# Patient Record
Sex: MALE | Race: WHITE | NOT HISPANIC OR LATINO | URBAN - METROPOLITAN AREA
[De-identification: names, ages, dates, MRNs, and addresses within clinical notes are randomized per-mention and may not be internally consistent; named-entity substitution may affect disease eponyms.]

---

## 2017-11-29 ENCOUNTER — INPATIENT (INPATIENT)
Facility: HOSPITAL | Age: 47
LOS: 2 days | Discharge: HOME | DRG: 65 | End: 2017-12-02
Attending: PSYCHIATRY & NEUROLOGY | Admitting: PSYCHIATRY & NEUROLOGY
Payer: COMMERCIAL

## 2017-11-29 VITALS
OXYGEN SATURATION: 97 % | TEMPERATURE: 98 F | RESPIRATION RATE: 18 BRPM | HEART RATE: 76 BPM | SYSTOLIC BLOOD PRESSURE: 180 MMHG | DIASTOLIC BLOOD PRESSURE: 97 MMHG

## 2017-11-29 DIAGNOSIS — B20 HUMAN IMMUNODEFICIENCY VIRUS [HIV] DISEASE: ICD-10-CM

## 2017-11-29 DIAGNOSIS — R47.01 APHASIA: ICD-10-CM

## 2017-11-29 DIAGNOSIS — F32.9 MAJOR DEPRESSIVE DISORDER, SINGLE EPISODE, UNSPECIFIED: ICD-10-CM

## 2017-11-29 DIAGNOSIS — R63.8 OTHER SYMPTOMS AND SIGNS CONCERNING FOOD AND FLUID INTAKE: ICD-10-CM

## 2017-11-29 DIAGNOSIS — Z29.9 ENCOUNTER FOR PROPHYLACTIC MEASURES, UNSPECIFIED: ICD-10-CM

## 2017-11-29 DIAGNOSIS — F41.9 ANXIETY DISORDER, UNSPECIFIED: ICD-10-CM

## 2017-11-29 LAB
ALBUMIN SERPL ELPH-MCNC: 4.4 G/DL — SIGNIFICANT CHANGE UP (ref 3.4–5)
ALP SERPL-CCNC: 48 U/L — SIGNIFICANT CHANGE UP (ref 40–120)
ALT FLD-CCNC: 39 U/L — SIGNIFICANT CHANGE UP (ref 12–42)
ANION GAP SERPL CALC-SCNC: 10 MMOL/L — SIGNIFICANT CHANGE UP (ref 9–16)
APPEARANCE UR: CLEAR — SIGNIFICANT CHANGE UP
AST SERPL-CCNC: 23 U/L — SIGNIFICANT CHANGE UP (ref 15–37)
BILIRUB SERPL-MCNC: 0.4 MG/DL — SIGNIFICANT CHANGE UP (ref 0.2–1.2)
BILIRUB UR-MCNC: NEGATIVE — SIGNIFICANT CHANGE UP
BUN SERPL-MCNC: 18 MG/DL — SIGNIFICANT CHANGE UP (ref 7–23)
CALCIUM SERPL-MCNC: 9.4 MG/DL — SIGNIFICANT CHANGE UP (ref 8.5–10.5)
CHLORIDE SERPL-SCNC: 103 MMOL/L — SIGNIFICANT CHANGE UP (ref 96–108)
CO2 SERPL-SCNC: 28 MMOL/L — SIGNIFICANT CHANGE UP (ref 22–31)
COLOR SPEC: YELLOW — SIGNIFICANT CHANGE UP
CREAT SERPL-MCNC: 1.07 MG/DL — SIGNIFICANT CHANGE UP (ref 0.5–1.3)
DIFF PNL FLD: NEGATIVE — SIGNIFICANT CHANGE UP
ETHANOL SERPL-MCNC: <3 MG/DL — SIGNIFICANT CHANGE UP
GLUCOSE SERPL-MCNC: 99 MG/DL — SIGNIFICANT CHANGE UP (ref 70–99)
GLUCOSE UR QL: NEGATIVE — SIGNIFICANT CHANGE UP
HCT VFR BLD CALC: 38.9 % — LOW (ref 39–50)
HGB BLD-MCNC: 13.5 G/DL — SIGNIFICANT CHANGE UP (ref 13–17)
INR BLD: 1.07 — SIGNIFICANT CHANGE UP (ref 0.88–1.16)
KETONES UR-MCNC: NEGATIVE — SIGNIFICANT CHANGE UP
LEUKOCYTE ESTERASE UR-ACNC: NEGATIVE — SIGNIFICANT CHANGE UP
MCHC RBC-ENTMCNC: 33 PG — SIGNIFICANT CHANGE UP (ref 27–34)
MCHC RBC-ENTMCNC: 34.7 G/DL — SIGNIFICANT CHANGE UP (ref 32–36)
MCV RBC AUTO: 95.1 FL — SIGNIFICANT CHANGE UP (ref 80–100)
NITRITE UR-MCNC: NEGATIVE — SIGNIFICANT CHANGE UP
PCP SPEC-MCNC: SIGNIFICANT CHANGE UP
PH UR: 5.5 — SIGNIFICANT CHANGE UP (ref 5–8)
PLATELET # BLD AUTO: 214 K/UL — SIGNIFICANT CHANGE UP (ref 150–400)
POTASSIUM SERPL-MCNC: 3.7 MMOL/L — SIGNIFICANT CHANGE UP (ref 3.5–5.3)
POTASSIUM SERPL-SCNC: 3.7 MMOL/L — SIGNIFICANT CHANGE UP (ref 3.5–5.3)
PROT SERPL-MCNC: 8.3 G/DL — HIGH (ref 6.4–8.2)
PROT UR-MCNC: NEGATIVE MG/DL — SIGNIFICANT CHANGE UP
PROTHROM AB SERPL-ACNC: 11.8 SEC — SIGNIFICANT CHANGE UP (ref 9.8–12.7)
RBC # BLD: 4.09 M/UL — LOW (ref 4.2–5.8)
RBC # FLD: 13.1 % — SIGNIFICANT CHANGE UP (ref 10.3–16.9)
SODIUM SERPL-SCNC: 141 MMOL/L — SIGNIFICANT CHANGE UP (ref 132–145)
SP GR SPEC: 1.01 — SIGNIFICANT CHANGE UP (ref 1–1.03)
TROPONIN I SERPL-MCNC: <0.017 NG/ML — LOW (ref 0.02–0.06)
UROBILINOGEN FLD QL: 0.2 E.U./DL — SIGNIFICANT CHANGE UP
WBC # BLD: 9.3 K/UL — SIGNIFICANT CHANGE UP (ref 3.8–10.5)
WBC # FLD AUTO: 9.3 K/UL — SIGNIFICANT CHANGE UP (ref 3.8–10.5)

## 2017-11-29 PROCEDURE — 93010 ELECTROCARDIOGRAM REPORT: CPT

## 2017-11-29 PROCEDURE — 70496 CT ANGIOGRAPHY HEAD: CPT | Mod: 26

## 2017-11-29 PROCEDURE — 99223 1ST HOSP IP/OBS HIGH 75: CPT

## 2017-11-29 PROCEDURE — 71020: CPT | Mod: 26

## 2017-11-29 PROCEDURE — 99285 EMERGENCY DEPT VISIT HI MDM: CPT | Mod: 25

## 2017-11-29 PROCEDURE — 70498 CT ANGIOGRAPHY NECK: CPT | Mod: 26

## 2017-11-29 RX ORDER — ATORVASTATIN CALCIUM 80 MG/1
80 TABLET, FILM COATED ORAL ONCE
Qty: 0 | Refills: 0 | Status: COMPLETED | OUTPATIENT
Start: 2017-11-29 | End: 2017-11-29

## 2017-11-29 RX ORDER — ASPIRIN/CALCIUM CARB/MAGNESIUM 324 MG
325 TABLET ORAL ONCE
Qty: 0 | Refills: 0 | Status: COMPLETED | OUTPATIENT
Start: 2017-11-29 | End: 2017-11-29

## 2017-11-29 RX ORDER — SODIUM CHLORIDE 9 MG/ML
3 INJECTION INTRAMUSCULAR; INTRAVENOUS; SUBCUTANEOUS ONCE
Qty: 0 | Refills: 0 | Status: COMPLETED | OUTPATIENT
Start: 2017-11-29 | End: 2017-11-29

## 2017-11-29 RX ADMIN — ATORVASTATIN CALCIUM 80 MILLIGRAM(S): 80 TABLET, FILM COATED ORAL at 18:47

## 2017-11-29 RX ADMIN — Medication 325 MILLIGRAM(S): at 18:47

## 2017-11-29 RX ADMIN — SODIUM CHLORIDE 3 MILLILITER(S): 9 INJECTION INTRAMUSCULAR; INTRAVENOUS; SUBCUTANEOUS at 17:36

## 2017-11-29 NOTE — ED PROVIDER NOTE - OBJECTIVE STATEMENT
47y M HIV+ (undetectable, on meds) with hx of depression, anxiety presents to ED c/o aphasia since. Pt states he woke up yesterday morning, and took an Ativan, which he takes occasionally. Pt noticed around 7AM that he was having difficulty speaking normally. Pt then went to his PMD for a depression follow-up, and was referred to ED to r/o stroke due to his difficulty communicating. Denies headache, visual changes, numbness, CP, SOB, abd pain, n/v/d, tinnitus, difficulty walking, focal weakness, lightheadedness, dizziness, fever, chills, head trauma, recent fall, or recent travel. Denies confusion, but notes he feels "slower than usual". Pt notes he stopped smoking 4 months ago, and has used cocaine in the past.     PMD: Dr. Debbie De La Fuente [phone]

## 2017-11-29 NOTE — ED PROVIDER NOTE - ATTENDING CONTRIBUTION TO CARE
Patient presenting with expressive aphasia since last night. VSS. + mild expressive aphasia on exam. CT head shows subacute left basal ganglia CVA. Admitted to [facility] neuro stroke svc.

## 2017-11-29 NOTE — H&P ADULT - NSHPSOCIALHISTORY_GEN_ALL_CORE
former smoker (quit 4 months ago) and has used cocaine in past former smoker (quit 4 months ago) and has used cocaine in past  + etoh, 2 drinks nightly, 0/4 CAGE

## 2017-11-29 NOTE — H&P ADULT - HISTORY OF PRESENT ILLNESS
47 year old male, HIV + (on HAART), with a pmhx of depression, anxiety, presents to ED with difficulty communicating x yesterday morning at apx 7am. At that time, patient took ativan, which he takes regularly for anxiety. Patient went to see his PMD for f/u visit, and was sent to ED for r/o CVA. Patient denies any confusion, but states he feels "slower than usual". Patient denies any headaches, visual changes, numbness, CP, SOB, abdominal pain, N/V/D, difficulty walking, focal weakness, lightheadedness, dizziness, fever, chills, head trauma, or recent fall. Of note, patient former smoker (quit 4 months ago) and has used cocaine in past. In ED, /97, HR 76. VS otherwise normal. CT head suggestive of non-acute stroke with hypodensity in left basal ganglia. CTA head + neck negative.  and Lipitor 80 given. Patient to be admitted to [facility] for further workup 47 year old male, HIV + (on HAART, CD4 "normal" with undetectable VL as per pt), with a pmhx of depression and anxiety, presents to ED with difficulty communicating x yesterday morning at apx 7am. At that time, patient took ativan, which he takes regularly for anxiety. Patient with little interaction that day, attributed aphasia to fatigue and tiredness. Patient noticed no improvement later in the evening while talking to . Patient went to see his PMD for f/u visit the next morning, and was sent to ED for r/o CVA. Patient denies any confusion, headaches, visual changes, numbness, CP, SOB, abdominal pain, N/V/D, difficulty walking, focal weakness, lightheadedness, dizziness, fever, chills, head trauma, or recent fall. Of note, patient former smoker (quit 4 months ago), +etoh use (2 drinks / night, 0/4 CAGE). and has used cocaine in past. Denies recent travel. In ED, /97, HR 76. VS otherwise normal. CT head suggestive of non-acute stroke with hypodensity in left basal ganglia. CTA head + neck negative.  and Lipitor 80 given. Patient to be admitted to [facility] for further workup 47 year old male, HIV + (on HAART, CD4 "normal" with undetectable VL as per pt), with a pmhx of depression and anxiety, presents to ED with difficulty communicating x yesterday morning at apx 7am. At that time, patient took ativan, which he takes regularly for anxiety. Patient with little interaction that day, attributed aphasia to fatigue and tiredness. Patient noticed no improvement later in the evening while talking to . Patient went to see his PMD for f/u visit the next morning, and was sent to ED for r/o CVA. Patient denies any confusion, headaches, visual changes, numbness, CP, SOB, abdominal pain, N/V/D, difficulty walking, focal weakness, lightheadedness, dizziness, fever, chills, head trauma, or recent fall. Of note, patient former smoker (quit 4 months ago), +etoh use (2 drinks / night, 0/4 CAGE). and has used cocaine in past. Denies recent travel. In ED, /97, HR 76. VS otherwise normal. CT head suggestive of non-acute stroke with hypodensity in left basal ganglia. CTA head + neck negative.  and Lipitor 80 given. NIH: 1 upon arrival. Patient to be admitted to [facility] for further workup 47 year old male, HIV + (on HAART, CD4 "normal" with undetectable VL as per pt), with a pmhx of depression and anxiety, presents to ED with difficulty communicating x yesterday morning at apx 7am. At that time, patient took ativan, which he takes regularly for anxiety. Patient with little interaction that day, attributed aphasia to fatigue and tiredness. Patient noticed no improvement later in the evening while talking to . Patient went to see his PMD for f/u visit the next morning, and was sent to ED for r/o CVA. Patient denies any confusion, headaches, visual changes, numbness, CP, SOB, abdominal pain, N/V/D, difficulty walking, focal weakness, lightheadedness, dizziness, fever, chills, head trauma, or recent fall. Of note, patient former smoker (quit 4 months ago), +etoh use (2 drinks / night, 0/4 CAGE). and has used cocaine in past. Denies recent travel. In ED, /97, HR 76. VS otherwise normal. CT head suggestive of subacute left BG stroke and an acute left frontal stroke CTA head + neck negative.  and Lipitor 80 given. NIH: 1 upon arrival. Patient to be admitted to [facility] for further workup

## 2017-11-29 NOTE — H&P ADULT - NSHPPHYSICALEXAM_GEN_ALL_CORE
.  VITAL SIGNS:  T(C): 37.1 (11-29-17 @ 21:52), Max: 37.1 (11-29-17 @ 21:52)  T(F): 98.8 (11-29-17 @ 21:52), Max: 98.8 (11-29-17 @ 21:52)  HR: 70 (11-30-17 @ 00:00) (67 - 77)  BP: 155/88 (11-30-17 @ 00:00) (137/81 - 180/97)  BP(mean): 115 (11-30-17 @ 00:00) (115 - 115)  RR: 18 (11-30-17 @ 00:00) (16 - 18)  SpO2: 98% (11-30-17 @ 00:00) (97% - 100%)  Wt(kg): --    PHYSICAL EXAM:    Constitutional: WDWN resting comfortably in bed; NAD  Head: NC/AT  Eyes: PERRL, EOMI, clear conjunctiva  ENT: no nasal discharge; uvula midline, no oropharyngeal erythema or exudates; MMM  Neck: supple; no JVD or thyromegaly  Respiratory: CTA B/L; no W/R/R, no retractions  Cardiac: +S1/S2; RRR; no M/R/G; PMI non-displaced  Gastrointestinal: soft, NT/ND; no rebound or guarding; +BSx4  Genitourinary: normal external genitalia  Back: spine midline, no bony tenderness or step-offs; no CVAT B/L  Extremities: WWP, no clubbing or cyanosis; no peripheral edema  Musculoskeletal: NROM x4; no joint swelling, tenderness or erythema  Vascular: 2+ radial, femoral, DP/PT pulses B/L  Dermatologic: skin warm, dry and intact; no rashes, wounds, or scars  Lymphatic: no submandibular or cervical LAD  Mental status: Awake, alert and oriented x3.  Recent and remote memory intact.  Naming, repetition and comprehension intact.  Attention/concentration intact.    *Expressive aphasia with visible frustration.   Motor:  Normal bulk and tone, strength 5/5 in bilateral upper and lower extremities.   strength 5/5.  Rapid alternating movements intact and symmetric.   Sensation: Intact to light touch, proprioception, vibration, temperature, pinprick.  No neglect.   Coordination: No dysmetria on finger-to-nose and heel-to-shin.  No clumsiness.  Reflexes: 2+ in upper and lower extremities, absent Babinski bilaterally  Gait: No ataxia

## 2017-11-29 NOTE — H&P ADULT - ASSESSMENT
47 year old male, HIV + (on HAART), with a pmhx of depression, anxiety, presents to ED with expressive aphasia

## 2017-11-29 NOTE — ED PROVIDER NOTE - MEDICAL DECISION MAKING DETAILS
46 y/o M presents to ED with expressive aphasia.  Last known well was 2 night ago before going to sleep.  No focal neuro deficits/weakness, changes in sensation.  CT consistent with basal ganglia subacute stroke.  Pt discussed with Dr. Larose, neurostroke on call via [facility] transfer center.  Pt accepted.  Will given  and Lipitor 80 mg as instructed by neuro, order CTA head and neck to be performed prior to transfer and pt can be transferred with results pending.  Pt amenable to admission.

## 2017-11-29 NOTE — ED ADULT NURSE NOTE - OBJECTIVE STATEMENT
Pt c/o difficulty speaking for 2 days. Pt was sent to ED by his PMD. Pt denies numbness, weakness. Moves all extremities full strength

## 2017-11-29 NOTE — H&P ADULT - PROBLEM SELECTOR PLAN 3
c/w fluoxetine daily  2-3 tablets of ativan / week (doesn't know dosage)  watch out for benzo withdrawal

## 2017-11-29 NOTE — H&P ADULT - NSHPLABSRESULTS_GEN_ALL_CORE
EXAM:  CT BRAIN  IMPRESSION: Focal area of hypodensity involving the left basal   ganglia/corona radiata which may represent subacute ischemia. No   intracranial hemorrhage.      EXAM:  CT ANGIO NECK (W)AW IC                        EXAM:  CT ANGIO BRAIN (W)AW IC                           PROCEDURE DATE:  11/29/2017     100  Omnipaque 350   5       INTERPRETATION:  PROCEDURE: CTA brain with and without intravenous   contrast.    INDICATION: Expressive aphasia    TECHNIQUE: Multiple thin section axial images were obtained through the   [place] following the intravenous injection of contrast. MPR   sagittal and coronal MIP images were generated from the axial images. 3-D   reconstructions were also obtained and postprocessed on a independent   workstation.    COMPARISON: None    FINDINGS: The CTA examination of the [place] demonstrates the   internal carotid arteries to be normal in caliber. Thereis a normal   bifurcation into the A1 and M1 segments. There is a normal MCA   bifurcation. There is tortuosity of the vertebral basilar system. The   vertebral arteries are otherwise normal in caliber. The basilar artery is   normal in caliber. There is a normal bifurcation into the posterior   cerebral arteries. The right P1 segment is hypoplastic with a prominent   right posterior communicating artery supplying the right PCA territory.   There are no areas of stenosis, dilatation or aneurysm.    IMPRESSION: No large vessel occlusion.      PROCEDURE: CTA neck with and without intravenous contrast.    INDICATION: Expressive aphasia    TECHNIQUE: Multiple axial thin section were obtained through the neck   following the intravenous injection of contrast. MPR sagittal and coronal   MIP images were generated from the axial images. 3-D reconstructions were   also obtained and postprocessed on a independent workstation.    COMPARISON: None    FINDINGS: The CTA examination demonstrates the right common carotid   artery to be normal in caliber. There is a normal bifurcation into the   right internal and external carotid arteries. There is no hemodynamically   significant stenosis. There is an ectatic course to the proximal right   internal carotid artery.    There is a retropharyngeal course to the left common carotid artery. The   left common carotid artery is otherwise normal in caliber. There is a   normal bifurcation into the left internal and external carotid arteries.   There is no hemodynamically significant stenosis. There is a looped   configuration to the distal left internal carotid artery.    There is an ectatic course to the proximal vertebral arteries   bilaterally. The visualized vertebral arteries are normal in caliber.    The aortic arch appears intact without narrowing of the origin of the   great vessels.    IMPRESSION: No carotid stenosis.

## 2017-11-29 NOTE — H&P ADULT - PROBLEM SELECTOR PLAN 1
NIH: 1 upon arrival to tele. expressive aphasia started on 11/28 at apx 7am. CT head with hypodense lesion at left basal gangia. CTA head + neck negative. s/p  and Lipitor 80 in ED.   - will c/w ASA 81mg, Lipitor 80mg daily   - PT/ OT/ speech consult   - bedside dysphagia screen   permissive hypertension   - echo with bubble  - likely will need ERNA  - consider MRI brain   - hemoglobin A1c, lipid profile, TSH, CBC, CMP, Coags   - smoking cessation NIH: 1 upon arrival to tele. expressive aphasia started on 11/28 at apx 7am. CT head with hypodense lesion at left basal gangia. CTA head + neck negative. s/p  and Lipitor 80 in ED.   - will c/w ASA 81mg, Lipitor 80mg daily   - PT/ OT/ speech consult   - bedside dysphagia screen   permissive hypertension   - echo with bubble  - likely will need ERNA  - EKG  - consider MRI brain   - hemoglobin A1c, lipid profile, TSH, CBC, CMP, Coags   - hypercoagulable workup   - smoking cessation

## 2017-11-29 NOTE — ED ADULT TRIAGE NOTE - CHIEF COMPLAINT QUOTE
Aphasia since yesterday. was sent by PMD to r/o CVA.   Pt states language was worst yesterday. Was able to speak  and communicate at triage, but states has problems, "putting words together"  patient crying and states has a history of HIV, ANXIETY, DEPRESSION.;

## 2017-11-29 NOTE — ED ADULT NURSE REASSESSMENT NOTE - NS ED NURSE REASSESS COMMENT FT1
AMANDA Doty spoke with transport again to inquire about EMS taking patient to [facility]; they are aware of how long It is taking to get EMS to transport patient; we are to call again if they do not show up in 30 minutes

## 2017-11-29 NOTE — H&P ADULT - PROBLEM SELECTOR PLAN 2
compliant. doesn't miss any doses. no side effects. good CD4, un det. VL as per pt   -c/w Tivicay, Descovy, Prezcobix  -contact HIV specialist/ PMD in AM for collateral (recent CD4/ VL)

## 2017-11-30 LAB
ALBUMIN SERPL ELPH-MCNC: 4.7 G/DL — SIGNIFICANT CHANGE UP (ref 3.3–5)
ALP SERPL-CCNC: 45 U/L — SIGNIFICANT CHANGE UP (ref 40–120)
ALT FLD-CCNC: 26 U/L — SIGNIFICANT CHANGE UP (ref 10–45)
ANION GAP SERPL CALC-SCNC: 16 MMOL/L — SIGNIFICANT CHANGE UP (ref 5–17)
APTT BLD: 32.5 SEC — SIGNIFICANT CHANGE UP (ref 27.5–37.4)
AST SERPL-CCNC: 23 U/L — SIGNIFICANT CHANGE UP (ref 10–40)
BILIRUB SERPL-MCNC: 0.4 MG/DL — SIGNIFICANT CHANGE UP (ref 0.2–1.2)
BUN SERPL-MCNC: 16 MG/DL — SIGNIFICANT CHANGE UP (ref 7–23)
CALCIUM SERPL-MCNC: 8.9 MG/DL — SIGNIFICANT CHANGE UP (ref 8.4–10.5)
CHLORIDE SERPL-SCNC: 99 MMOL/L — SIGNIFICANT CHANGE UP (ref 96–108)
CHOLEST SERPL-MCNC: 322 MG/DL — HIGH (ref 10–199)
CO2 SERPL-SCNC: 25 MMOL/L — SIGNIFICANT CHANGE UP (ref 22–31)
CREAT SERPL-MCNC: 1.11 MG/DL — SIGNIFICANT CHANGE UP (ref 0.5–1.3)
GLUCOSE SERPL-MCNC: 116 MG/DL — HIGH (ref 70–99)
HBA1C BLD-MCNC: 5.2 % — SIGNIFICANT CHANGE UP (ref 4–5.6)
HCT VFR BLD CALC: 40.6 % — SIGNIFICANT CHANGE UP (ref 39–50)
HCYS SERPL-MCNC: 7.2 UMOL/L — SIGNIFICANT CHANGE UP (ref 5–15)
HDLC SERPL-MCNC: 42 MG/DL — SIGNIFICANT CHANGE UP (ref 40–125)
HGB BLD-MCNC: 13.4 G/DL — SIGNIFICANT CHANGE UP (ref 13–17)
INR BLD: 1.04 — SIGNIFICANT CHANGE UP (ref 0.88–1.16)
LIPID PNL WITH DIRECT LDL SERPL: 211 MG/DL — HIGH
MCHC RBC-ENTMCNC: 32 PG — SIGNIFICANT CHANGE UP (ref 27–34)
MCHC RBC-ENTMCNC: 33 G/DL — SIGNIFICANT CHANGE UP (ref 32–36)
MCV RBC AUTO: 96.9 FL — SIGNIFICANT CHANGE UP (ref 80–100)
PLATELET # BLD AUTO: 231 K/UL — SIGNIFICANT CHANGE UP (ref 150–400)
POTASSIUM SERPL-MCNC: 4.1 MMOL/L — SIGNIFICANT CHANGE UP (ref 3.5–5.3)
POTASSIUM SERPL-SCNC: 4.1 MMOL/L — SIGNIFICANT CHANGE UP (ref 3.5–5.3)
PROT SERPL-MCNC: 7.7 G/DL — SIGNIFICANT CHANGE UP (ref 6–8.3)
PROTHROM AB SERPL-ACNC: 11.6 SEC — SIGNIFICANT CHANGE UP (ref 9.8–12.7)
RBC # BLD: 4.19 M/UL — LOW (ref 4.2–5.8)
RBC # FLD: 13.7 % — SIGNIFICANT CHANGE UP (ref 10.3–16.9)
SODIUM SERPL-SCNC: 140 MMOL/L — SIGNIFICANT CHANGE UP (ref 135–145)
TOTAL CHOLESTEROL/HDL RATIO MEASUREMENT: 7.7 RATIO — SIGNIFICANT CHANGE UP (ref 3.4–9.6)
TRIGL SERPL-MCNC: 343 MG/DL — HIGH (ref 10–149)
TSH SERPL-MCNC: 2.99 UIU/ML — SIGNIFICANT CHANGE UP (ref 0.35–4.94)
WBC # BLD: 6.8 K/UL — SIGNIFICANT CHANGE UP (ref 3.8–10.5)
WBC # FLD AUTO: 6.8 K/UL — SIGNIFICANT CHANGE UP (ref 3.8–10.5)

## 2017-11-30 PROCEDURE — 93306 TTE W/DOPPLER COMPLETE: CPT | Mod: 26

## 2017-11-30 PROCEDURE — 99233 SBSQ HOSP IP/OBS HIGH 50: CPT

## 2017-11-30 PROCEDURE — 93010 ELECTROCARDIOGRAM REPORT: CPT

## 2017-11-30 RX ORDER — DOLUTEGRAVIR SODIUM 25 MG/1
1 TABLET, FILM COATED ORAL
Qty: 0 | Refills: 0 | COMMUNITY

## 2017-11-30 RX ORDER — ASPIRIN/CALCIUM CARB/MAGNESIUM 324 MG
81 TABLET ORAL DAILY
Qty: 0 | Refills: 0 | Status: DISCONTINUED | OUTPATIENT
Start: 2017-11-30 | End: 2017-12-02

## 2017-11-30 RX ORDER — FLUOXETINE HCL 10 MG
40 CAPSULE ORAL DAILY
Qty: 0 | Refills: 0 | Status: DISCONTINUED | OUTPATIENT
Start: 2017-11-30 | End: 2017-12-02

## 2017-11-30 RX ORDER — DARUNAVIR ETHANOLATE AND COBICISTAT 800; 150 MG/1; MG/1
1 TABLET, FILM COATED ORAL
Qty: 0 | Refills: 0 | COMMUNITY

## 2017-11-30 RX ORDER — FLUOXETINE HCL 10 MG
1 CAPSULE ORAL
Qty: 0 | Refills: 0 | COMMUNITY

## 2017-11-30 RX ORDER — EMTRICITABINE AND TENOFOVIR DISOPROXIL FUMARATE 200; 300 MG/1; MG/1
1 TABLET, FILM COATED ORAL
Qty: 0 | Refills: 0 | COMMUNITY

## 2017-11-30 RX ORDER — DARUNAVIR ETHANOLATE AND COBICISTAT 800; 150 MG/1; MG/1
1 TABLET, FILM COATED ORAL DAILY
Qty: 0 | Refills: 0 | Status: DISCONTINUED | OUTPATIENT
Start: 2017-11-30 | End: 2017-12-02

## 2017-11-30 RX ORDER — ATORVASTATIN CALCIUM 80 MG/1
80 TABLET, FILM COATED ORAL AT BEDTIME
Qty: 0 | Refills: 0 | Status: DISCONTINUED | OUTPATIENT
Start: 2017-11-30 | End: 2017-12-02

## 2017-11-30 RX ORDER — EMTRICITABINE AND TENOFOVIR DISOPROXIL FUMARATE 200; 300 MG/1; MG/1
1 TABLET, FILM COATED ORAL DAILY
Qty: 0 | Refills: 0 | Status: DISCONTINUED | OUTPATIENT
Start: 2017-11-30 | End: 2017-12-02

## 2017-11-30 RX ORDER — HEPARIN SODIUM 5000 [USP'U]/ML
5000 INJECTION INTRAVENOUS; SUBCUTANEOUS EVERY 8 HOURS
Qty: 0 | Refills: 0 | Status: DISCONTINUED | OUTPATIENT
Start: 2017-11-30 | End: 2017-12-01

## 2017-11-30 RX ORDER — DOLUTEGRAVIR SODIUM 25 MG/1
50 TABLET, FILM COATED ORAL DAILY
Qty: 0 | Refills: 0 | Status: DISCONTINUED | OUTPATIENT
Start: 2017-11-30 | End: 2017-12-02

## 2017-11-30 RX ADMIN — DOLUTEGRAVIR SODIUM 50 MILLIGRAM(S): 25 TABLET, FILM COATED ORAL at 12:56

## 2017-11-30 RX ADMIN — DARUNAVIR ETHANOLATE AND COBICISTAT 1 TABLET(S): 800; 150 TABLET, FILM COATED ORAL at 12:55

## 2017-11-30 RX ADMIN — Medication 81 MILLIGRAM(S): at 12:58

## 2017-11-30 RX ADMIN — Medication 40 MILLIGRAM(S): at 13:01

## 2017-11-30 RX ADMIN — HEPARIN SODIUM 5000 UNIT(S): 5000 INJECTION INTRAVENOUS; SUBCUTANEOUS at 15:54

## 2017-11-30 RX ADMIN — EMTRICITABINE AND TENOFOVIR DISOPROXIL FUMARATE 1 TABLET(S): 200; 300 TABLET, FILM COATED ORAL at 12:56

## 2017-11-30 RX ADMIN — HEPARIN SODIUM 5000 UNIT(S): 5000 INJECTION INTRAVENOUS; SUBCUTANEOUS at 06:21

## 2017-11-30 RX ADMIN — HEPARIN SODIUM 5000 UNIT(S): 5000 INJECTION INTRAVENOUS; SUBCUTANEOUS at 22:00

## 2017-11-30 NOTE — PHYSICAL THERAPY INITIAL EVALUATION ADULT - PERTINENT HX OF CURRENT PROBLEM, REHAB EVAL
Pt. is a 47 y.o. male admitted with expressive aphasia. Head CT - significant for subacute L basal ganglia infarct.

## 2017-11-30 NOTE — PROGRESS NOTE ADULT - SUBJECTIVE AND OBJECTIVE BOX
OVERNIGHT EVENTS: ZORAN    SUBJECTIVE / INTERVAL HPI: Patient seen and examined at bedside. Patient denies any acute complaints however frustrated with his speech. ROS negative.     VITAL SIGNS:  Vital Signs Last 24 Hrs  T(C): 36.8 (2017 09:13), Max: 37.1 (2017 21:52)  T(F): 98.3 (2017 09:13), Max: 98.8 (2017 21:52)  HR: 66 (2017 11:11) (58 - 77)  BP: 135/86 (2017 13:03) (135/86 - 180/97)  BP(mean): 105 (2017 13:03) (101 - 115)  RR: 16 (2017 11:11) (16 - 18)  SpO2: 97% (2017 11:11) (96% - 100%)    PHYSICAL EXAM:    General: WDWN male resting in bed  HEENT: NC/AT; PERRL, anicteric sclera; MMM  Neck: supple  Cardiovascular: +S1/S2; RRR  Respiratory: CTA B/L; no W/R/R  Gastrointestinal: soft, NT/ND; +BSx4  Extremities: WWP; no edema, clubbing or cyanosis  Vascular: 2+ radial, DP/PT pulses B/L  Neurological: AAOx3; no focal deficits. Expressive aphasia.    MEDICATIONS:  MEDICATIONS  (STANDING):  aspirin  chewable 81 milliGRAM(s) Oral daily  atorvastatin 80 milliGRAM(s) Oral at bedtime  darunavir 800 mG/cobicstat 150 mG 1 Tablet(s) Oral daily  dolutegravir 50 milliGRAM(s) Oral daily  emtricitabine 200 mG/tenofovir alafenamide 25 mG (DESCOVY) Tablet 1 Tablet(s) Oral daily  FLUoxetine 40 milliGRAM(s) Oral daily  heparin  Injectable 5000 Unit(s) SubCutaneous every 8 hours    MEDICATIONS  (PRN):      ALLERGIES:  Allergies    No Known Allergies    Intolerances        LABS:                        13.4   6.8   )-----------( 231      ( 2017 06:27 )             40.6     11-30    140  |  99  |  16  ----------------------------<  116<H>  4.1   |  25  |  1.11    Ca    8.9      2017 06:27    TPro  7.7  /  Alb  4.7  /  TBili  0.4  /  DBili  x   /  AST  23  /  ALT  26  /  AlkPhos  45  11-30    PT/INR - ( 2017 06:27 )   PT: 11.6 sec;   INR: 1.04          PTT - ( 2017 06:27 )  PTT:32.5 sec  Urinalysis Basic - ( 2017 19:24 )    Color: Yellow / Appearance: Clear / S.010 / pH: x  Gluc: x / Ketone: NEGATIVE  / Bili: NEGATIVE / Urobili: 0.2 E.U./dL   Blood: x / Protein: NEGATIVE mg/dL / Nitrite: NEGATIVE   Leuk Esterase: NEGATIVE / RBC: x / WBC x   Sq Epi: x / Non Sq Epi: x / Bacteria: x      CAPILLARY BLOOD GLUCOSE      POCT Blood Glucose.: 99 mg/dL (2017 11:32)      RADIOLOGY & ADDITIONAL TESTS: Reviewed.    ASSESSMENT:    PLAN:

## 2017-11-30 NOTE — PHYSICAL THERAPY INITIAL EVALUATION ADULT - MODALITIES TREATMENT COMMENTS
Facial symmetry - intact, no visual deficits, speech fluent, but with + word finding difficulties. Romberg - negative, tandem walking - no loss of balance.

## 2017-12-01 DIAGNOSIS — I63.429: ICD-10-CM

## 2017-12-01 LAB
ANION GAP SERPL CALC-SCNC: 12 MMOL/L — SIGNIFICANT CHANGE UP (ref 5–17)
APCR PPP: 2.05 RATIO — LOW
AT III ACT/NOR PPP CHRO: 105 % — SIGNIFICANT CHANGE UP (ref 85–135)
B2 GLYCOPROT1 AB SER QL: POSITIVE
BUN SERPL-MCNC: 18 MG/DL — SIGNIFICANT CHANGE UP (ref 7–23)
CALCIUM SERPL-MCNC: 8.7 MG/DL — SIGNIFICANT CHANGE UP (ref 8.4–10.5)
CARDIOLIPIN AB SER-ACNC: NEGATIVE — SIGNIFICANT CHANGE UP
CHLORIDE SERPL-SCNC: 98 MMOL/L — SIGNIFICANT CHANGE UP (ref 96–108)
CO2 SERPL-SCNC: 24 MMOL/L — SIGNIFICANT CHANGE UP (ref 22–31)
CREAT SERPL-MCNC: 1.03 MG/DL — SIGNIFICANT CHANGE UP (ref 0.5–1.3)
DRVVT SCREEN TO CONFIRM RATIO: (no result)
GLUCOSE SERPL-MCNC: 113 MG/DL — HIGH (ref 70–99)
HCT VFR BLD CALC: 41.8 % — SIGNIFICANT CHANGE UP (ref 39–50)
HCYS SERPL-MCNC: 7.2 UMOL/L — SIGNIFICANT CHANGE UP (ref 5–15)
HGB BLD-MCNC: 13.8 G/DL — SIGNIFICANT CHANGE UP (ref 13–17)
LA NT DPL PPP QL: 35.6 SEC — SIGNIFICANT CHANGE UP
MCHC RBC-ENTMCNC: 32.3 PG — SIGNIFICANT CHANGE UP (ref 27–34)
MCHC RBC-ENTMCNC: 33 G/DL — SIGNIFICANT CHANGE UP (ref 32–36)
MCV RBC AUTO: 97.9 FL — SIGNIFICANT CHANGE UP (ref 80–100)
PLATELET # BLD AUTO: 196 K/UL — SIGNIFICANT CHANGE UP (ref 150–400)
POTASSIUM SERPL-MCNC: 3.8 MMOL/L — SIGNIFICANT CHANGE UP (ref 3.5–5.3)
POTASSIUM SERPL-SCNC: 3.8 MMOL/L — SIGNIFICANT CHANGE UP (ref 3.5–5.3)
PROT C ACT/NOR PPP: 138 % — SIGNIFICANT CHANGE UP (ref 74–150)
RBC # BLD: 4.27 M/UL — SIGNIFICANT CHANGE UP (ref 4.2–5.8)
RBC # FLD: 13.6 % — SIGNIFICANT CHANGE UP (ref 10.3–16.9)
SODIUM SERPL-SCNC: 134 MMOL/L — LOW (ref 135–145)
T PALLIDUM AB TITR SER: NEGATIVE — SIGNIFICANT CHANGE UP
VIT B12 SERPL-MCNC: 692 PG/ML — SIGNIFICANT CHANGE UP (ref 243–894)
WBC # BLD: 6 K/UL — SIGNIFICANT CHANGE UP (ref 3.8–10.5)
WBC # FLD AUTO: 6 K/UL — SIGNIFICANT CHANGE UP (ref 3.8–10.5)

## 2017-12-01 PROCEDURE — 99222 1ST HOSP IP/OBS MODERATE 55: CPT

## 2017-12-01 PROCEDURE — 70551 MRI BRAIN STEM W/O DYE: CPT | Mod: 26

## 2017-12-01 PROCEDURE — 99233 SBSQ HOSP IP/OBS HIGH 50: CPT

## 2017-12-01 RX ORDER — ENOXAPARIN SODIUM 100 MG/ML
90 INJECTION SUBCUTANEOUS DAILY
Qty: 0 | Refills: 0 | Status: DISCONTINUED | OUTPATIENT
Start: 2017-12-01 | End: 2017-12-01

## 2017-12-01 RX ORDER — ENOXAPARIN SODIUM 100 MG/ML
90 INJECTION SUBCUTANEOUS EVERY 12 HOURS
Qty: 0 | Refills: 0 | Status: DISCONTINUED | OUTPATIENT
Start: 2017-12-01 | End: 2017-12-02

## 2017-12-01 RX ADMIN — Medication 81 MILLIGRAM(S): at 11:56

## 2017-12-01 RX ADMIN — DARUNAVIR ETHANOLATE AND COBICISTAT 1 TABLET(S): 800; 150 TABLET, FILM COATED ORAL at 11:56

## 2017-12-01 RX ADMIN — ENOXAPARIN SODIUM 90 MILLIGRAM(S): 100 INJECTION SUBCUTANEOUS at 21:50

## 2017-12-01 RX ADMIN — DOLUTEGRAVIR SODIUM 50 MILLIGRAM(S): 25 TABLET, FILM COATED ORAL at 11:56

## 2017-12-01 RX ADMIN — ATORVASTATIN CALCIUM 80 MILLIGRAM(S): 80 TABLET, FILM COATED ORAL at 21:50

## 2017-12-01 RX ADMIN — EMTRICITABINE AND TENOFOVIR DISOPROXIL FUMARATE 1 TABLET(S): 200; 300 TABLET, FILM COATED ORAL at 11:56

## 2017-12-01 RX ADMIN — HEPARIN SODIUM 5000 UNIT(S): 5000 INJECTION INTRAVENOUS; SUBCUTANEOUS at 06:41

## 2017-12-01 RX ADMIN — Medication 40 MILLIGRAM(S): at 11:56

## 2017-12-01 RX ADMIN — HEPARIN SODIUM 5000 UNIT(S): 5000 INJECTION INTRAVENOUS; SUBCUTANEOUS at 14:09

## 2017-12-01 RX ADMIN — ATORVASTATIN CALCIUM 80 MILLIGRAM(S): 80 TABLET, FILM COATED ORAL at 00:09

## 2017-12-01 NOTE — CONSULT NOTE ADULT - PROBLEM SELECTOR RECOMMENDATION 9
pt has low APC resistance, Factor V leiden pending and lupus anticoagulants, discussed with Dr. Tovar will start  AC with Lovenox 1mg/kg BID

## 2017-12-01 NOTE — CONSULT NOTE ADULT - SUBJECTIVE AND OBJECTIVE BOX
HPI:  47 year old male, HIV + (on HAART, CD4 "normal" with undetectable VL as per pt), with a pmhx of depression and anxiety, presents to ED with difficulty communicating x yesterday morning at apx 7am. At that time, patient took ativan, which he takes regularly for anxiety. Patient with little interaction that day, attributed aphasia to fatigue and tiredness. Patient noticed no improvement later in the evening while talking to . Patient went to see his PMD for f/u visit the next morning, and was sent to ED for r/o CVA. Patient denies any confusion, headaches, visual changes, numbness, CP, SOB, abdominal pain, N/V/D, difficulty walking, focal weakness, lightheadedness, dizziness, fever, chills, head trauma, or recent fall. Of note, patient former smoker (quit 4 months ago), +etoh use (2 drinks / night, 0/4 CAGE). and has used cocaine in past. Denies recent travel. In ED, /97, HR 76. VS otherwise normal. CT head suggestive of subacute left BG stroke and an acute left frontal stroke CTA head + neck negative.  and Lipitor 80 given. NIH: 1 upon arrival. Patient to be admitted to [facility] for further workup (2017 21:29)      PAST MEDICAL & SURGICAL HISTORY:  Anxiety  Depression  HIV (human immunodeficiency virus infection)       Review of Systems:  · General	negative  · Skin/Breast	negative  · Ophthalmologic	negative  · ENMT	negative  · Respiratory and Thorax	negative  · Cardiovascular	negative  · Gastrointestinal	negative  · Genitourinary	negative  · Musculoskeletal Comments	negative  · Neurological	negative      MEDICATIONS  (STANDING):  aspirin  chewable 81 milliGRAM(s) Oral daily  atorvastatin 80 milliGRAM(s) Oral at bedtime  darunavir 800 mG/cobicstat 150 mG 1 Tablet(s) Oral daily  dolutegravir 50 milliGRAM(s) Oral daily  emtricitabine 200 mG/tenofovir alafenamide 25 mG (DESCOVY) Tablet 1 Tablet(s) Oral daily  FLUoxetine 40 milliGRAM(s) Oral daily  heparin  Injectable 5000 Unit(s) SubCutaneous every 8 hours    MEDICATIONS  (PRN):      Allergies    No Known Allergies    Intolerances        SOCIAL HISTORY:    FAMILY HISTORY:      Vital Signs Last 24 Hrs  T(C): 37.4 (01 Dec 2017 13:52), Max: 37.4 (01 Dec 2017 13:52)  T(F): 99.3 (01 Dec 2017 13:52), Max: 99.3 (01 Dec 2017 13:52)  HR: 60 (01 Dec 2017 12:00) (56 - 64)  BP: 132/77 (01 Dec 2017 12:00) (126/81 - 145/83)  BP(mean): 99 (01 Dec 2017 12:00) (95 - 109)  RR: 12 (01 Dec 2017 12:00) (12 - 18)  SpO2: 98% (01 Dec 2017 12:00) (95% - 98%)     Physical Exam:  · Constitutional	detailed exam  · Constitutional Details	well-developed; well-groomed  · Eyes	EOMI; PERRL; no drainage or redness  · ENMT Comments	dry mucous membranes  · Respiratory	detailed exam  · Respiratory Comments	normal breath sounds at the lung bases bilaterally  · Cardiovascular	Regular rate & rhythm, normal S1, S2; no murmurs, gallops or rubs; no S3, S4  · Abd-Soft non tender  ·Ext-no edema, clubbing or cyanosis      LABS:                        13.8   6.0   )-----------( 196      ( 01 Dec 2017 07:04 )             41.8     12-    134<L>  |  98  |  18  ----------------------------<  113<H>  3.8   |  24  |  1.03    Ca    8.7      01 Dec 2017 07:05    TPro  7.7  /  Alb  4.7  /  TBili  0.4  /  DBili  x   /  AST  23  /  ALT  26  /  AlkPhos  45  11-30    PT/INR - ( 2017 06:27 )   PT: 11.6 sec;   INR: 1.04          PTT - ( 2017 06:27 )  PTT:32.5 sec  Urinalysis Basic - ( 2017 19:24 )    Color: Yellow / Appearance: Clear / S.010 / pH: x  Gluc: x / Ketone: NEGATIVE  / Bili: NEGATIVE / Urobili: 0.2 E.U./dL   Blood: x / Protein: NEGATIVE mg/dL / Nitrite: NEGATIVE   Leuk Esterase: NEGATIVE / RBC: x / WBC x   Sq Epi: x / Non Sq Epi: x / Bacteria: x        RADIOLOGY & ADDITIONAL STUDIES:

## 2017-12-01 NOTE — SPEECH LANGUAGE PATHOLOGY EVALUATION - SLP ORIENTATION
Oriented to self, place and date. Pt reported, "I have problems with finding the words." Oriented to self, place and date. Pt reported, "I have problems with finding the words." Pt reported that he is currently still working. 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

## 2017-12-01 NOTE — SPEECH LANGUAGE PATHOLOGY EVALUATION - SLP ANTICIPATED DISCHARGE DISPOSITION
Recommend 3 sessions/week in an out-patient setting given the need for cognitive-linguistic skills in his job and no other deficits that are preventing him from returning to work.

## 2017-12-01 NOTE — SPEECH LANGUAGE PATHOLOGY EVALUATION - SLP CONVERSATIONAL SPEECH
Pt p/w word-finding difficulties during conversation. Benefits from additional time and is then able to independently generate the word. Pt p/w word-finding difficulties during conversation. Benefits from additional time and is then able to independently generate the word most of the time. FOr example, when asked what television he enjoys, it took him ~30 seconds to generate the title "modern [employer]."

## 2017-12-01 NOTE — PROGRESS NOTE ADULT - SUBJECTIVE AND OBJECTIVE BOX
OVERNIGHT EVENTS:    SUBJECTIVE / INTERVAL HPI: Patient seen and examined at bedside.     VITAL SIGNS:  Vital Signs Last 24 Hrs  T(C): 37 (2017 21:10), Max: 37 (2017 21:10)  T(F): 98.6 (2017 21:10), Max: 98.6 (2017 21:10)  HR: 56 (01 Dec 2017 03:45) (56 - 74)  BP: 133/72 (01 Dec 2017 03:45) (133/72 - 154/92)  BP(mean): 95 (01 Dec 2017 03:45) (95 - 115)  RR: 18 (01 Dec 2017 03:45) (16 - 18)  SpO2: 97% (01 Dec 2017 03:45) (96% - 98%)    PHYSICAL EXAM:    General: WDWN  HEENT: NC/AT; PERRL, anicteric sclera; MMM  Neck: supple  Cardiovascular: +S1/S2; RRR  Respiratory: CTA B/L; no W/R/R  Gastrointestinal: soft, NT/ND; +BSx4  Extremities: WWP; no edema, clubbing or cyanosis  Vascular: 2+ radial, DP/PT pulses B/L  Neurological: AAOx3; no focal deficits    MEDICATIONS:  MEDICATIONS  (STANDING):  aspirin  chewable 81 milliGRAM(s) Oral daily  atorvastatin 80 milliGRAM(s) Oral at bedtime  darunavir 800 mG/cobicstat 150 mG 1 Tablet(s) Oral daily  dolutegravir 50 milliGRAM(s) Oral daily  emtricitabine 200 mG/tenofovir alafenamide 25 mG (DESCOVY) Tablet 1 Tablet(s) Oral daily  FLUoxetine 40 milliGRAM(s) Oral daily  heparin  Injectable 5000 Unit(s) SubCutaneous every 8 hours    MEDICATIONS  (PRN):      ALLERGIES:  Allergies    No Known Allergies    Intolerances        LABS:                        13.4   6.8   )-----------( 231      ( 2017 06:27 )             40.6     11-30    140  |  99  |  16  ----------------------------<  116<H>  4.1   |  25  |  1.11    Ca    8.9      2017 06:27    TPro  7.7  /  Alb  4.7  /  TBili  0.4  /  DBili  x   /  AST  23  /  ALT  26  /  AlkPhos  45  11-30    PT/INR - ( 2017 06:27 )   PT: 11.6 sec;   INR: 1.04          PTT - ( 2017 06:27 )  PTT:32.5 sec  Urinalysis Basic - ( 2017 19:24 )    Color: Yellow / Appearance: Clear / S.010 / pH: x  Gluc: x / Ketone: NEGATIVE  / Bili: NEGATIVE / Urobili: 0.2 E.U./dL   Blood: x / Protein: NEGATIVE mg/dL / Nitrite: NEGATIVE   Leuk Esterase: NEGATIVE / RBC: x / WBC x   Sq Epi: x / Non Sq Epi: x / Bacteria: x      CAPILLARY BLOOD GLUCOSE      POCT Blood Glucose.: 99 mg/dL (2017 11:32)      RADIOLOGY & ADDITIONAL TESTS: Reviewed.    ASSESSMENT:    PLAN:

## 2017-12-01 NOTE — CONSULT NOTE ADULT - PROBLEM SELECTOR PROBLEM 1
Cerebrovascular accident (CVA) due to embolism of anterior cerebral artery, unspecified blood vessel laterality

## 2017-12-01 NOTE — SPEECH LANGUAGE PATHOLOGY EVALUATION - SLP DIAGNOSIS
Pt p/w mild expressive aphasia characterized primarily by difficulty w word-finding. Although additional response time is beneficial, he is not yet able to always generate the desired word. He would benefit from learning/employing strategies to help increase the efficiency of word retrieval. His speech was also marked by decreased speaking rate, halted speech pattern and syllable/word repetitions. He also p/w cognitive linguistic challenges, marked by difficulty with reasoning and executive functioning tasks which are required of him for his occupation. Given these observations, he would benefit from intense speech-language/cognitive-linguistic therapy upon D/C from [facility].

## 2017-12-01 NOTE — SPEECH LANGUAGE PATHOLOGY EVALUATION - COMMENTS
During the [employer] picture description task, pt exhibited false starts but was able to provide a general description of the picture with sentences such as "The boy is getting a cookie" and "The mother is looking oblivious doing dishes."    Halted speech pattern during connected speech also noted w syllable and word repetitions. Speech was 90% intelligible, but imprecise articulation ntoed at times. Per pt, he agreed that ppl have been asking him to repeat himself more often now compared to baseline. 47 year old male, HIV + (on HAART), with a pmhx of depression, anxiety, presented to ED with expressive aphasia . Patient denies any confusion, headaches, visual changes, numbness, CP, SOB, abdominal pain, N/V/D, difficulty walking, focal weakness, lightheadedness, dizziness, fever, chills, head trauma, or recent fall. Of note, patient former smoker (quit 4 months ago), +etoh use (2 drinks / night, 0/4 CAGE). and has used cocaine in past. Denies recent travel. In ED, /97, HR 76. VS otherwise normal. CT head suggestive of subacute left BG stroke and an acute left frontal stroke CTA head + neck negative. This SVC will continue to follow during his hospital course at [facility] During the [employer] picture description task, pt exhibited some false starts but was able to provide a general description of the picture with sentences such as "The boy is getting a cookie" and "The mother is looking oblivious doing dishes."    Halted speech pattern during connected speech also noted w syllable and word repetitions. Speech was 90% intelligible, but imprecise articulation ntoed at times. Per pt, he agreed that ppl have been asking him to repeat himself more often now compared to baseline. Difficulty with tasks involving mental manipulation (e.g., spelling backwards), divergent categorization, convergent thinking, deductive reasoning, and inductive reasoning.

## 2017-12-01 NOTE — SPEECH LANGUAGE PATHOLOGY EVALUATION - SLP GENERAL OBSERVATIONS
Pt was received alert, seated in chair at bedside, speaking with his . His  left and was not present for this evaluation. Pt c/o the following: (1) word-finding difficulty, (2) decreased rate of speech output (3) difficulty holding train of thought and (4) difficulty with cognitive loaded tasks such as playing word games

## 2017-12-01 NOTE — SPEECH LANGUAGE PATHOLOGY EVALUATION - SLP EXECUTIVE FUNCTIONS
impaired Pt was asked to describe the process/steps of planning a party. Pt was noted to omit many important parts despite being given additional response time. He then began to become tearful, acknowledging a noticeable change in his cognitive-linguistic skills./impaired

## 2017-12-02 ENCOUNTER — TRANSCRIPTION ENCOUNTER (OUTPATIENT)
Age: 47
End: 2017-12-02

## 2017-12-02 VITALS — TEMPERATURE: 96 F

## 2017-12-02 DIAGNOSIS — D68.51 ACTIVATED PROTEIN C RESISTANCE: ICD-10-CM

## 2017-12-02 PROCEDURE — 70551 MRI BRAIN STEM W/O DYE: CPT

## 2017-12-02 PROCEDURE — 83921 ORGANIC ACID SINGLE QUANT: CPT

## 2017-12-02 PROCEDURE — 85670 THROMBIN TIME PLASMA: CPT

## 2017-12-02 PROCEDURE — 80053 COMPREHEN METABOLIC PANEL: CPT

## 2017-12-02 PROCEDURE — 70498 CT ANGIOGRAPHY NECK: CPT

## 2017-12-02 PROCEDURE — 92523 SPEECH SOUND LANG COMPREHEN: CPT | Mod: GN

## 2017-12-02 PROCEDURE — 99232 SBSQ HOSP IP/OBS MODERATE 35: CPT

## 2017-12-02 PROCEDURE — 36415 COLL VENOUS BLD VENIPUNCTURE: CPT

## 2017-12-02 PROCEDURE — 86780 TREPONEMA PALLIDUM: CPT

## 2017-12-02 PROCEDURE — 70496 CT ANGIOGRAPHY HEAD: CPT

## 2017-12-02 PROCEDURE — 86146 BETA-2 GLYCOPROTEIN ANTIBODY: CPT

## 2017-12-02 PROCEDURE — 71046 X-RAY EXAM CHEST 2 VIEWS: CPT

## 2017-12-02 PROCEDURE — 70450 CT HEAD/BRAIN W/O DYE: CPT

## 2017-12-02 PROCEDURE — 85307 ASSAY ACTIVATED PROTEIN C: CPT

## 2017-12-02 PROCEDURE — 93005 ELECTROCARDIOGRAM TRACING: CPT

## 2017-12-02 PROCEDURE — 85027 COMPLETE CBC AUTOMATED: CPT

## 2017-12-02 PROCEDURE — 85613 RUSSELL VIPER VENOM DILUTED: CPT

## 2017-12-02 PROCEDURE — 80061 LIPID PANEL: CPT

## 2017-12-02 PROCEDURE — 97161 PT EVAL LOW COMPLEX 20 MIN: CPT

## 2017-12-02 PROCEDURE — 83036 HEMOGLOBIN GLYCOSYLATED A1C: CPT

## 2017-12-02 PROCEDURE — 81003 URINALYSIS AUTO W/O SCOPE: CPT

## 2017-12-02 PROCEDURE — 84484 ASSAY OF TROPONIN QUANT: CPT

## 2017-12-02 PROCEDURE — C8925: CPT

## 2017-12-02 PROCEDURE — 85306 CLOT INHIBIT PROT S FREE: CPT

## 2017-12-02 PROCEDURE — 83090 ASSAY OF HOMOCYSTEINE: CPT

## 2017-12-02 PROCEDURE — 82962 GLUCOSE BLOOD TEST: CPT

## 2017-12-02 PROCEDURE — 82607 VITAMIN B-12: CPT

## 2017-12-02 PROCEDURE — 99285 EMERGENCY DEPT VISIT HI MDM: CPT | Mod: 25

## 2017-12-02 PROCEDURE — 93306 TTE W/DOPPLER COMPLETE: CPT

## 2017-12-02 PROCEDURE — 85610 PROTHROMBIN TIME: CPT

## 2017-12-02 PROCEDURE — 85730 THROMBOPLASTIN TIME PARTIAL: CPT

## 2017-12-02 PROCEDURE — 80307 DRUG TEST PRSMV CHEM ANLYZR: CPT

## 2017-12-02 PROCEDURE — 85300 ANTITHROMBIN III ACTIVITY: CPT

## 2017-12-02 PROCEDURE — 85303 CLOT INHIBIT PROT C ACTIVITY: CPT

## 2017-12-02 PROCEDURE — 84443 ASSAY THYROID STIM HORMONE: CPT

## 2017-12-02 PROCEDURE — 80048 BASIC METABOLIC PNL TOTAL CA: CPT

## 2017-12-02 PROCEDURE — 85598 HEXAGNAL PHOSPH PLTLT NEUTRL: CPT

## 2017-12-02 RX ORDER — ATORVASTATIN CALCIUM 80 MG/1
1 TABLET, FILM COATED ORAL
Qty: 90 | Refills: 1 | OUTPATIENT
Start: 2017-12-02 | End: 2018-05-30

## 2017-12-02 RX ORDER — ASPIRIN/CALCIUM CARB/MAGNESIUM 324 MG
1 TABLET ORAL
Qty: 0 | Refills: 0 | COMMUNITY
Start: 2017-12-02

## 2017-12-02 RX ORDER — ATORVASTATIN CALCIUM 80 MG/1
1 TABLET, FILM COATED ORAL
Qty: 30 | Refills: 2 | OUTPATIENT
Start: 2017-12-02 | End: 2018-03-01

## 2017-12-02 RX ORDER — ENOXAPARIN SODIUM 100 MG/ML
100 INJECTION SUBCUTANEOUS
Qty: 3000 | Refills: 0 | OUTPATIENT
Start: 2017-12-02 | End: 2018-01-01

## 2017-12-02 RX ORDER — WARFARIN SODIUM 2.5 MG/1
1 TABLET ORAL
Qty: 30 | Refills: 0 | OUTPATIENT
Start: 2017-12-02 | End: 2018-01-01

## 2017-12-02 RX ORDER — ATORVASTATIN CALCIUM 80 MG/1
1 TABLET, FILM COATED ORAL
Qty: 0 | Refills: 0 | COMMUNITY
Start: 2017-12-02

## 2017-12-02 RX ORDER — ASPIRIN/CALCIUM CARB/MAGNESIUM 324 MG
1 TABLET ORAL
Qty: 90 | Refills: 2 | OUTPATIENT
Start: 2017-12-02 | End: 2018-08-28

## 2017-12-02 RX ORDER — ENOXAPARIN SODIUM 100 MG/ML
90 INJECTION SUBCUTANEOUS
Qty: 0 | Refills: 0 | COMMUNITY
Start: 2017-12-02

## 2017-12-02 RX ADMIN — DOLUTEGRAVIR SODIUM 50 MILLIGRAM(S): 25 TABLET, FILM COATED ORAL at 11:19

## 2017-12-02 RX ADMIN — Medication 40 MILLIGRAM(S): at 11:19

## 2017-12-02 RX ADMIN — Medication 81 MILLIGRAM(S): at 11:19

## 2017-12-02 RX ADMIN — EMTRICITABINE AND TENOFOVIR DISOPROXIL FUMARATE 1 TABLET(S): 200; 300 TABLET, FILM COATED ORAL at 11:19

## 2017-12-02 RX ADMIN — ENOXAPARIN SODIUM 90 MILLIGRAM(S): 100 INJECTION SUBCUTANEOUS at 06:19

## 2017-12-02 NOTE — DISCHARGE NOTE ADULT - CARE PROVIDER_API CALL
Chandni Tovar), Neurology; Vascular Neurology  [address] [place] [address]  Phone: [phone]  Fax: [phone]

## 2017-12-02 NOTE — DISCHARGE NOTE ADULT - CARE PLAN
Principal Discharge DX:	CVA (cerebral vascular accident)  Goal:	To improve speech  Instructions for follow-up, activity and diet:	On this admission, you were admitted for rule out stroke. It was determined that you had acute infarction extending from the left corona radiata into the left basal ganglia. You were instructed to follow up with Dr. Tovar in his office on Tuesday at 9am. Please continue to take aspirin 81mg once a day, Lovenox 90mg twice a day, and Lipitor 80mg once a day.  Secondary Diagnosis:	Depression  Instructions for follow-up, activity and diet:	You have a history of depression. Please continue to take all of your home medications and follow up with your primary care provider.  Secondary Diagnosis:	Need for prophylactic measure  Instructions for follow-up, activity and diet:	Please continue to take all of your home medications.  Secondary Diagnosis:	Anxiety  Instructions for follow-up, activity and diet:	You have a history of anxiety. Please continue to take all of your home medications and follow up with your primary care provider.  Secondary Diagnosis:	Nutrition, metabolism, and development symptoms  Instructions for follow-up, activity and diet:	Please continue to eat healthy and follow up with all of your primary care physicians. Principal Discharge DX:	CVA (cerebral vascular accident)  Goal:	To improve speech  Instructions for follow-up, activity and diet:	On this admission, you were admitted for rule out stroke. It was determined that you had acute infarction extending from the left corona radiata into the left basal ganglia. You were instructed to follow up with Dr. Tovar in his office on Tuesday at 9am. Please continue to take aspirin 81mg once a day, Lovenox 100mg twice a day, and Lipitor 80mg once a day.  Secondary Diagnosis:	Depression  Instructions for follow-up, activity and diet:	You have a history of depression. Please continue to take all of your home medications and follow up with your primary care provider.  Secondary Diagnosis:	Need for prophylactic measure  Instructions for follow-up, activity and diet:	Please continue to take all of your home medications.  Secondary Diagnosis:	Anxiety  Instructions for follow-up, activity and diet:	You have a history of anxiety. Please continue to take all of your home medications and follow up with your primary care provider.  Secondary Diagnosis:	Nutrition, metabolism, and development symptoms  Instructions for follow-up, activity and diet:	Please continue to eat healthy and follow up with all of your primary care physicians. Principal Discharge DX:	CVA (cerebral vascular accident)  Goal:	To improve language  Instructions for follow-up, activity and diet:	On this admission, you were admitted for rule out stroke. It was determined that you had acute infarction extending from the left corona radiata into the left basal ganglia. You were instructed to follow up with Dr. Tovar in his office on Tuesday at 9am. Please continue to take aspirin 81mg once a day, Lovenox 100mg twice a day, and Lipitor 80mg once a day.  Secondary Diagnosis:	Depression  Instructions for follow-up, activity and diet:	You have a history of depression. Please continue to take all of your home medications and follow up with your primary care provider.  Secondary Diagnosis:	Need for prophylactic measure  Instructions for follow-up, activity and diet:	Please continue to take all of your home medications.  Secondary Diagnosis:	Anxiety  Instructions for follow-up, activity and diet:	You have a history of anxiety. Please continue to take all of your home medications and follow up with your primary care provider.  Secondary Diagnosis:	Nutrition, metabolism, and development symptoms  Instructions for follow-up, activity and diet:	Please continue to eat healthy and follow up with all of your primary care physicians.

## 2017-12-02 NOTE — DISCHARGE NOTE ADULT - PATIENT PORTAL LINK FT
“You can access the FollowHealth Patient Portal, offered by [facility], by registering with the following website: [email or web address]”

## 2017-12-02 NOTE — OCCUPATIONAL THERAPY INITIAL EVALUATION ADULT - PERTINENT HX OF CURRENT PROBLEM, REHAB EVAL
Patient is a 48 yo RHD male, presenting with expressive aphasia and mild cognitive deficits s/p L subacute basal ganglia infarct.

## 2017-12-02 NOTE — DISCHARGE NOTE ADULT - PLAN OF CARE
To improve speech On this admission, you were admitted for rule out stroke. It was determined that you had acute infarction extending from the left corona radiata into the left basal ganglia. You were instructed to follow up with Dr. Tovar in his office on Tuesday at 9am. Please continue to take aspirin 81mg once a day, Lovenox 90mg twice a day, and Lipitor 80mg once a day. You have a history of depression. Please continue to take all of your home medications and follow up with your primary care provider. Please continue to take all of your home medications. You have a history of anxiety. Please continue to take all of your home medications and follow up with your primary care provider. Please continue to eat healthy and follow up with all of your primary care physicians. On this admission, you were admitted for rule out stroke. It was determined that you had acute infarction extending from the left corona radiata into the left basal ganglia. You were instructed to follow up with Dr. Tovar in his office on Tuesday at 9am. Please continue to take aspirin 81mg once a day, Lovenox 100mg twice a day, and Lipitor 80mg once a day. To improve language

## 2017-12-02 NOTE — DISCHARGE NOTE ADULT - HOSPITAL COURSE
48 y/o male HIV + (on HAART, CD4 normal with undetectable VL per patient) with PMH depression and anxiiety who presented to the ED with difficulty communicating. Patient visited his PMD who sent him to the ED to r/o CVA.  In ED, /97, HR 76. VS otherwise normal. CT head suggestive of subacute left BG stroke and an acute left frontal stroke CTA head + neck negative.  and Lipitor 80 given. NIH: 1 upon arrival. Patient to be admitted to [address] [facility] for further workup. Started on ASA, lipitor 880. Endy negative. MRI brain significant for cute infarction extending from the left corona radiata into the left basal ganglia. Hypercoaguable work out sent and was + for factor V leiden and lupus anticoagulant. Patient was started on lovenox 90mg BID and deemed stable for discharge with instructions to follow up with Dr. Tovar.

## 2017-12-02 NOTE — OCCUPATIONAL THERAPY INITIAL EVALUATION ADULT - SHORT TERM MEMORY, REHAB EVAL
Patient recalled 0/5 words after 10 minutes, able to recall 2 words with category cue and 2 words with multiple choice cue./impaired

## 2017-12-02 NOTE — OCCUPATIONAL THERAPY INITIAL EVALUATION ADULT - NS ASR FOLLOW COMMAND OT EVAL
[place] Cognitive Assessment: patient scored 22/30 (with normal score being greater than or equal to 26) indicating mild cognitive impairments. Most notable difficulty with sustained attention, short term recall, and language fluency./able to follow multistep instructions/100% of the time

## 2017-12-02 NOTE — PROGRESS NOTE ADULT - SUBJECTIVE AND OBJECTIVE BOX
HEALTH ISSUES - PROBLEM Dx:  Cerebrovascular accident (CVA) due to embolism of anterior cerebral artery, unspecified blood vessel laterality: Cerebrovascular accident (CVA) due to embolism of anterior cerebral artery, unspecified blood vessel laterality  Nutrition, metabolism, and development symptoms: Nutrition, metabolism, and development symptoms  Need for prophylactic measure: Need for prophylactic measure  Depression: Depression  Anxiety: Anxiety  HIV (human immunodeficiency virus infection): HIV (human immunodeficiency virus infection)  Expressive aphasia: Expressive aphasia          CHEMOTHERAPY REGIMEN:        Day:                          Diet:  Protocol:                                    IVF:      MEDICATIONS  (STANDING):  aspirin  chewable 81 milliGRAM(s) Oral daily  atorvastatin 80 milliGRAM(s) Oral at bedtime  darunavir 800 mG/cobicstat 150 mG 1 Tablet(s) Oral daily  dolutegravir 50 milliGRAM(s) Oral daily  emtricitabine 200 mG/tenofovir alafenamide 25 mG (DESCOVY) Tablet 1 Tablet(s) Oral daily  enoxaparin Injectable 90 milliGRAM(s) SubCutaneous every 12 hours  FLUoxetine 40 milliGRAM(s) Oral daily    MEDICATIONS  (PRN):      Allergies    No Known Allergies    Intolerances        DVT Prophylaxis: [ ] YES [ ] NO      Antibiotics: [ ] YES [ ] NO    Pain Scale (1-10):       Location:    Vital Signs Last 24 Hrs  T(C): 35.8 (02 Dec 2017 09:43), Max: 37.4 (01 Dec 2017 13:52)  T(F): 96.4 (02 Dec 2017 09:43), Max: 99.3 (01 Dec 2017 13:52)  HR: 66 (02 Dec 2017 08:53) (56 - 66)  BP: 149/78 (02 Dec 2017 08:53) (106/57 - 149/78)  BP(mean): 107 (02 Dec 2017 08:53) (79 - 107)  RR: 20 (02 Dec 2017 08:53) (11 - 20)  SpO2: 97% (02 Dec 2017 08:53) (96% - 98%)    Drug Dosing Weight  Height (cm): 180.3 (30 Nov 2017 00:51)  Weight (kg): 91.8 (30 Nov 2017 00:51)  BMI (kg/m2): 28.2 (30 Nov 2017 00:51)  BSA (m2): 2.12 (30 Nov 2017 00:51)     Physical Exam:  · Constitutional	detailed exam  · Constitutional Details	well-developed; well-groomed  · Eyes	EOMI; PERRL; no drainage or redness  · ENMT Comments	dry mucous membranes  · Respiratory	detailed exam  · Respiratory Comments	normal breath sounds at the lung bases bilaterally  · Cardiovascular	Regular rate & rhythm, normal S1, S2; no murmurs, gallops or rubs; no S3, S4  · Abd-Soft non tender  ·Ext-no edema, clubbing or cyanosis    URINARY CATHETER: [ ] YES [ ] NO     LABS:  CBC Full  -  ( 01 Dec 2017 07:04 )  WBC Count : 6.0 K/uL  Hemoglobin : 13.8 g/dL  Hematocrit : 41.8 %  Platelet Count - Automated : 196 K/uL  Mean Cell Volume : 97.9 fL  Mean Cell Hemoglobin : 32.3 pg  Mean Cell Hemoglobin Concentration : 33.0 g/dL  Auto Neutrophil # : x  Auto Lymphocyte # : x  Auto Monocyte # : x  Auto Eosinophil # : x  Auto Basophil # : x  Auto Neutrophil % : x  Auto Lymphocyte % : x  Auto Monocyte % : x  Auto Eosinophil % : x  Auto Basophil % : x    12-01    134<L>  |  98  |  18  ----------------------------<  113<H>  3.8   |  24  |  1.03    Ca    8.7      01 Dec 2017 07:05            CULTURES:    RADIOLOGY & ADDITIONAL STUDIES:

## 2017-12-02 NOTE — DISCHARGE NOTE ADULT - MEDICATION SUMMARY - MEDICATIONS TO TAKE
I will START or STAY ON the medications listed below when I get home from the hospital:    aspirin 81 mg oral tablet, chewable  -- 1 tab(s) by mouth once a day  -- Indication: For CVA (cerebral vascular accident)    aspirin 81 mg oral tablet  -- 1 tab(s) by mouth once a day   -- Take with food or milk.    -- Indication: For CVA (cerebral vascular accident)    enoxaparin 100 mg/mL injectable solution  -- 100 milligram(s) injectable 2 times a day   -- It is very important that you take or use this exactly as directed.  Do not skip doses or discontinue unless directed by your doctor.    -- Indication: For CVA (cerebral vascular accident)    FLUoxetine 10 mg oral capsule  -- 1 cap(s) by mouth once a day  -- Indication: For Depression    atorvastatin 80 mg oral tablet  -- 1 tab(s) by mouth once a day   -- Avoid grapefruit and grapefruit juice while taking this medication.  Do not take this drug if you are pregnant.  It is very important that you take or use this exactly as directed.  Do not skip doses or discontinue unless directed by your doctor.  Obtain medical advice before taking any non-prescription drugs as some may affect the action of this medication.  Take with food or milk.    -- Indication: For Cerebrovascular accident (CVA) due to embolism of anterior cerebral artery, unspecified blood vessel laterality    Descovy 200 mg-25 mg oral tablet  -- 1 tab(s) by mouth once a day  -- Indication: For HIV (human immunodeficiency virus infection)    Tivicay 50 mg oral tablet  -- 1 tab(s) by mouth once a day  -- Indication: For HIV (human immunodeficiency virus infection)    Prezcobix 800 mg-150 mg oral tablet  -- 1 tab(s) by mouth once a day  -- Indication: For HIV (human immunodeficiency virus infection) I will START or STAY ON the medications listed below when I get home from the hospital:    aspirin 81 mg oral tablet, chewable  -- 1 tab(s) by mouth once a day  -- Indication: For CVA (cerebral vascular accident)    aspirin 81 mg oral tablet  -- 1 tab(s) by mouth once a day   -- Take with food or milk.    -- Indication: For CVA (cerebral vascular accident)    Coumadin 5 mg oral tablet  -- 1 tab(s) by mouth once a day (at bedtime)   -- Do not take this drug if you are pregnant.  It is very important that you take or use this exactly as directed.  Do not skip doses or discontinue unless directed by your doctor.  Obtain medical advice before taking any non-prescription drugs as some may affect the action of this medication.    -- Indication: For Cerebrovascular accident (CVA) due to embolism of anterior cerebral artery, unspecified blood vessel laterality    enoxaparin 100 mg/mL injectable solution  -- 100 milligram(s) injectable 2 times a day   -- It is very important that you take or use this exactly as directed.  Do not skip doses or discontinue unless directed by your doctor.    -- Indication: For CVA (cerebral vascular accident)    FLUoxetine 10 mg oral capsule  -- 1 cap(s) by mouth once a day  -- Indication: For Depression    atorvastatin 20 mg oral tablet  -- 1 tab(s) by mouth once a day   -- Avoid grapefruit and grapefruit juice while taking this medication.  Do not take this drug if you are pregnant.  It is very important that you take or use this exactly as directed.  Do not skip doses or discontinue unless directed by your doctor.  Obtain medical advice before taking any non-prescription drugs as some may affect the action of this medication.  Take with food or milk.    -- Indication: For CVA (cerebral vascular accident)    Descovy 200 mg-25 mg oral tablet  -- 1 tab(s) by mouth once a day  -- Indication: For HIV (human immunodeficiency virus infection)    Tivicay 50 mg oral tablet  -- 1 tab(s) by mouth once a day  -- Indication: For HIV (human immunodeficiency virus infection)    Prezcobix 800 mg-150 mg oral tablet  -- 1 tab(s) by mouth once a day  -- Indication: For HIV (human immunodeficiency virus infection)

## 2017-12-02 NOTE — OCCUPATIONAL THERAPY INITIAL EVALUATION ADULT - GENERAL OBSERVATIONS, REHAB EVAL
Patient received standing in room in NAD, +EKG. Patient denies pain or discomfort, agreeable for session.

## 2017-12-04 LAB — PROT S FREE PPP-ACNC: 117 % NORMAL — SIGNIFICANT CHANGE UP (ref 70–150)

## 2017-12-05 ENCOUNTER — APPOINTMENT (OUTPATIENT)
Dept: NEUROLOGY | Facility: CLINIC | Age: 47
End: 2017-12-05
Payer: COMMERCIAL

## 2017-12-05 VITALS
WEIGHT: 205 LBS | SYSTOLIC BLOOD PRESSURE: 119 MMHG | DIASTOLIC BLOOD PRESSURE: 69 MMHG | BODY MASS INDEX: 27.77 KG/M2 | OXYGEN SATURATION: 98 % | TEMPERATURE: 98.6 F | HEART RATE: 62 BPM | HEIGHT: 72 IN

## 2017-12-05 DIAGNOSIS — F41.9 ANXIETY DISORDER, UNSPECIFIED: ICD-10-CM

## 2017-12-05 DIAGNOSIS — R47.01 APHASIA: ICD-10-CM

## 2017-12-05 DIAGNOSIS — F32.9 MAJOR DEPRESSIVE DISORDER, SINGLE EPISODE, UNSPECIFIED: ICD-10-CM

## 2017-12-05 DIAGNOSIS — D68.51 ACTIVATED PROTEIN C RESISTANCE: ICD-10-CM

## 2017-12-05 DIAGNOSIS — I63.49 CEREBRAL INFARCTION DUE TO EMBOLISM OF OTHER CEREBRAL ARTERY: ICD-10-CM

## 2017-12-05 DIAGNOSIS — Z21 ASYMPTOMATIC HUMAN IMMUNODEFICIENCY VIRUS [HIV] INFECTION STATUS: ICD-10-CM

## 2017-12-05 DIAGNOSIS — R29.701 NIHSS SCORE 1: ICD-10-CM

## 2017-12-05 DIAGNOSIS — Z87.891 PERSONAL HISTORY OF NICOTINE DEPENDENCE: ICD-10-CM

## 2017-12-05 DIAGNOSIS — R53.82 CHRONIC FATIGUE, UNSPECIFIED: ICD-10-CM

## 2017-12-05 DIAGNOSIS — Z72.89 OTHER PROBLEMS RELATED TO LIFESTYLE: ICD-10-CM

## 2017-12-05 DIAGNOSIS — F19.90 OTHER PSYCHOACTIVE SUBSTANCE USE, UNSPECIFIED, UNCOMPLICATED: ICD-10-CM

## 2017-12-05 DIAGNOSIS — I10 ESSENTIAL (PRIMARY) HYPERTENSION: ICD-10-CM

## 2017-12-05 DIAGNOSIS — Z86.73 PERSONAL HISTORY OF TRANSIENT ISCHEMIC ATTACK (TIA), AND CEREBRAL INFARCTION W/OUT RESIDUAL DEFICITS: ICD-10-CM

## 2017-12-05 DIAGNOSIS — Z78.9 OTHER SPECIFIED HEALTH STATUS: ICD-10-CM

## 2017-12-05 PROBLEM — B20 HUMAN IMMUNODEFICIENCY VIRUS [HIV] DISEASE: Chronic | Status: ACTIVE | Noted: 2017-11-29

## 2017-12-05 PROBLEM — Z00.00 ENCOUNTER FOR PREVENTIVE HEALTH EXAMINATION: Status: ACTIVE | Noted: 2017-12-05

## 2017-12-05 PROCEDURE — 99215 OFFICE O/P EST HI 40 MIN: CPT

## 2017-12-05 RX ORDER — LORAZEPAM 0.5 MG/1
0.5 TABLET ORAL
Qty: 20 | Refills: 0 | Status: DISCONTINUED | COMMUNITY
Start: 2017-11-13 | End: 2017-12-05

## 2017-12-05 RX ORDER — ATORVASTATIN CALCIUM 20 MG/1
20 TABLET, FILM COATED ORAL
Qty: 30 | Refills: 0 | Status: DISCONTINUED | COMMUNITY
Start: 2017-12-02 | End: 2017-12-05

## 2017-12-05 RX ORDER — DOLUTEGRAVIR SODIUM 50 MG/1
50 TABLET, FILM COATED ORAL DAILY
Refills: 0 | Status: ACTIVE | COMMUNITY

## 2017-12-05 RX ORDER — FLUOXETINE HYDROCHLORIDE 40 MG/1
40 CAPSULE ORAL
Qty: 30 | Refills: 0 | Status: DISCONTINUED | COMMUNITY
Start: 2017-03-29 | End: 2017-12-05

## 2017-12-05 RX ORDER — DARUNAVIR 800 MG/1
800 TABLET, FILM COATED ORAL
Qty: 30 | Refills: 0 | Status: DISCONTINUED | COMMUNITY
Start: 2017-04-24 | End: 2017-12-05

## 2017-12-05 RX ORDER — DOLUTEGRAVIR SODIUM 50 MG/1
50 TABLET, FILM COATED ORAL
Qty: 30 | Refills: 0 | Status: DISCONTINUED | COMMUNITY
Start: 2017-04-24 | End: 2017-12-05

## 2017-12-05 RX ORDER — BUPROPION HYDROCHLORIDE 150 MG/1
150 TABLET, EXTENDED RELEASE ORAL
Qty: 30 | Refills: 0 | Status: DISCONTINUED | COMMUNITY
Start: 2017-11-13 | End: 2017-12-05

## 2017-12-05 RX ORDER — FLUOXETINE HYDROCHLORIDE 40 MG/1
40 CAPSULE ORAL
Refills: 0 | Status: ACTIVE | COMMUNITY

## 2017-12-05 RX ORDER — LORAZEPAM 0.5 MG/1
0.5 TABLET ORAL
Refills: 0 | Status: ACTIVE | COMMUNITY

## 2017-12-05 RX ORDER — ASPIRIN ENTERIC COATED TABLETS 81 MG 81 MG/1
81 TABLET, DELAYED RELEASE ORAL
Qty: 30 | Refills: 0 | Status: DISCONTINUED | COMMUNITY
Start: 2017-12-02 | End: 2017-12-05

## 2017-12-05 RX ORDER — ENOXAPARIN SODIUM 100 MG/ML
100 INJECTION SUBCUTANEOUS
Qty: 60 | Refills: 5 | Status: DISCONTINUED | COMMUNITY
End: 2017-12-05

## 2017-12-05 RX ORDER — RITONAVIR 100 MG/1
100 TABLET, FILM COATED ORAL
Qty: 30 | Refills: 0 | Status: DISCONTINUED | COMMUNITY
Start: 2017-04-24 | End: 2017-12-05

## 2017-12-05 RX ORDER — EMTRICITABINE AND TENOFOVIR ALAFENAMIDE 200; 25 MG/1; MG/1
200-25 TABLET ORAL DAILY
Refills: 0 | Status: ACTIVE | COMMUNITY

## 2017-12-05 RX ORDER — EMTRICITABINE AND TENOFOVIR ALAFENAMIDE 200; 25 MG/1; MG/1
200-25 TABLET ORAL
Qty: 30 | Refills: 0 | Status: DISCONTINUED | COMMUNITY
Start: 2017-04-24 | End: 2017-12-05

## 2017-12-05 RX ORDER — DARUNAVIR ETHANOLATE AND COBICISTAT 800; 150 MG/1; MG/1
800-150 TABLET, FILM COATED ORAL
Qty: 30 | Refills: 0 | Status: DISCONTINUED | COMMUNITY
Start: 2017-07-27 | End: 2017-12-05

## 2017-12-05 RX ORDER — NEOMYCIN SULFATE, POLYMYXIN B SULFATE, HYDROCORTISONE 3.5; 10000; 1 MG/ML; [USP'U]/ML; MG/ML
1 SOLUTION/ DROPS AURICULAR (OTIC)
Qty: 10 | Refills: 0 | Status: DISCONTINUED | COMMUNITY
Start: 2017-07-27 | End: 2017-12-05

## 2017-12-05 RX ORDER — WARFARIN 5 MG/1
5 TABLET ORAL
Qty: 30 | Refills: 0 | Status: DISCONTINUED | COMMUNITY
Start: 2017-12-02 | End: 2017-12-05

## 2017-12-05 RX ORDER — BUPROPION HYDROCHLORIDE 300 MG/1
300 TABLET, EXTENDED RELEASE ORAL
Qty: 30 | Refills: 0 | Status: DISCONTINUED | COMMUNITY
Start: 2017-04-24 | End: 2017-12-05

## 2017-12-06 LAB — METHYLMALONATE SERPL-SCNC: 148 NMOL/L — SIGNIFICANT CHANGE UP (ref 0–378)

## 2017-12-26 ENCOUNTER — APPOINTMENT (OUTPATIENT)
Dept: HEMATOLOGY ONCOLOGY | Facility: CLINIC | Age: 47
End: 2017-12-26
Payer: COMMERCIAL

## 2017-12-26 VITALS
SYSTOLIC BLOOD PRESSURE: 133 MMHG | DIASTOLIC BLOOD PRESSURE: 83 MMHG | WEIGHT: 201 LBS | HEIGHT: 72 IN | BODY MASS INDEX: 27.22 KG/M2 | RESPIRATION RATE: 14 BRPM | TEMPERATURE: 98.1 F | HEART RATE: 71 BPM | OXYGEN SATURATION: 99 %

## 2017-12-26 DIAGNOSIS — Z82.3 FAMILY HISTORY OF STROKE: ICD-10-CM

## 2017-12-26 DIAGNOSIS — B20 HUMAN IMMUNODEFICIENCY VIRUS [HIV] DISEASE: ICD-10-CM

## 2017-12-26 DIAGNOSIS — Z82.0 FAMILY HISTORY OF EPILEPSY AND OTHER DISEASES OF THE NERVOUS SYSTEM: ICD-10-CM

## 2017-12-26 DIAGNOSIS — Z80.8 FAMILY HISTORY OF MALIGNANT NEOPLASM OF OTHER ORGANS OR SYSTEMS: ICD-10-CM

## 2017-12-26 PROCEDURE — 99214 OFFICE O/P EST MOD 30 MIN: CPT

## 2017-12-26 RX ORDER — ENOXAPARIN SODIUM 100 MG/ML
100 INJECTION SUBCUTANEOUS
Qty: 60 | Refills: 5 | Status: DISCONTINUED | COMMUNITY
Start: 2017-12-02 | End: 2017-12-26

## 2017-12-26 RX ORDER — ASPIRIN 81 MG
81 TABLET, DELAYED RELEASE (ENTERIC COATED) ORAL DAILY
Qty: 90 | Refills: 1 | Status: DISCONTINUED | COMMUNITY
End: 2017-12-26

## 2018-01-08 ENCOUNTER — APPOINTMENT (OUTPATIENT)
Dept: NEUROLOGY | Facility: CLINIC | Age: 48
End: 2018-01-08

## 2018-02-06 ENCOUNTER — APPOINTMENT (OUTPATIENT)
Dept: HEMATOLOGY ONCOLOGY | Facility: CLINIC | Age: 48
End: 2018-02-06
Payer: COMMERCIAL

## 2018-02-06 VITALS
DIASTOLIC BLOOD PRESSURE: 79 MMHG | TEMPERATURE: 100.5 F | HEART RATE: 92 BPM | RESPIRATION RATE: 14 BRPM | WEIGHT: 196 LBS | BODY MASS INDEX: 26.55 KG/M2 | HEIGHT: 72 IN | SYSTOLIC BLOOD PRESSURE: 115 MMHG | OXYGEN SATURATION: 99 %

## 2018-02-06 PROCEDURE — 99214 OFFICE O/P EST MOD 30 MIN: CPT | Mod: 25

## 2018-02-06 PROCEDURE — 36415 COLL VENOUS BLD VENIPUNCTURE: CPT

## 2018-02-08 LAB
APTT 2H P 1:4 NP PPP: 35.2 SEC
APTT 2H P INC PPP: 35.2 SEC
APTT IMM NP/PRE NP PPP: 34.6 SEC
APTT INV RATIO PPP: 43.9 SEC
B2 GLYCOPROT1 IGA SERPL IA-ACNC: 45.9 SAU
B2 GLYCOPROT1 IGG SER-ACNC: <5 SGU
B2 GLYCOPROT1 IGM SER-ACNC: 11.2 SMU
CARDIOLIPIN AB SER IA-ACNC: NEGATIVE
CONFIRM: 41.7 SEC
DRVVT 1H NP PPP: 37.6 SEC
DRVVT IMM 1:2 NP PPP: NORMAL
DRVVT SCREEN TO CONFIRM RATIO: 1.2 RATIO
HBA1C MFR BLD HPLC: 5.2 %
NPP NORMAL POOLED PLASMA: 32.4 SEC
SCREEN DRVVT: 56 SEC
SILICA CLOTTING TIME INTERPRETATION: NORMAL
SILICA CLOTTING TIME S/C: 0.91 RATIO

## 2018-02-15 ENCOUNTER — APPOINTMENT (OUTPATIENT)
Dept: NEUROLOGY | Facility: CLINIC | Age: 48
End: 2018-02-15
Payer: COMMERCIAL

## 2018-02-15 VITALS
BODY MASS INDEX: 26.55 KG/M2 | OXYGEN SATURATION: 97 % | WEIGHT: 196 LBS | SYSTOLIC BLOOD PRESSURE: 137 MMHG | HEART RATE: 73 BPM | DIASTOLIC BLOOD PRESSURE: 80 MMHG | HEIGHT: 72 IN

## 2018-02-15 PROCEDURE — 99214 OFFICE O/P EST MOD 30 MIN: CPT

## 2018-03-27 ENCOUNTER — APPOINTMENT (OUTPATIENT)
Dept: HEMATOLOGY ONCOLOGY | Facility: CLINIC | Age: 48
End: 2018-03-27

## 2018-04-13 ENCOUNTER — APPOINTMENT (OUTPATIENT)
Dept: NEUROLOGY | Facility: CLINIC | Age: 48
End: 2018-04-13

## 2018-04-13 VITALS
TEMPERATURE: 98.5 F | DIASTOLIC BLOOD PRESSURE: 81 MMHG | HEIGHT: 72 IN | BODY MASS INDEX: 26.41 KG/M2 | WEIGHT: 195 LBS | OXYGEN SATURATION: 100 % | HEART RATE: 66 BPM | SYSTOLIC BLOOD PRESSURE: 131 MMHG

## 2018-04-18 ENCOUNTER — APPOINTMENT (OUTPATIENT)
Dept: HEMATOLOGY ONCOLOGY | Facility: CLINIC | Age: 48
End: 2018-04-18

## 2018-04-18 ENCOUNTER — LABORATORY RESULT (OUTPATIENT)
Age: 48
End: 2018-04-18

## 2018-04-23 ENCOUNTER — APPOINTMENT (OUTPATIENT)
Dept: NEUROLOGY | Facility: CLINIC | Age: 48
End: 2018-04-23
Payer: COMMERCIAL

## 2018-04-23 VITALS
HEIGHT: 72 IN | DIASTOLIC BLOOD PRESSURE: 81 MMHG | HEART RATE: 68 BPM | SYSTOLIC BLOOD PRESSURE: 133 MMHG | BODY MASS INDEX: 25.73 KG/M2 | WEIGHT: 190 LBS | OXYGEN SATURATION: 98 %

## 2018-04-23 DIAGNOSIS — R76.0 RAISED ANTIBODY TITER: ICD-10-CM

## 2018-04-23 PROCEDURE — 99213 OFFICE O/P EST LOW 20 MIN: CPT

## 2018-04-24 LAB
B2 GLYCOPROT1 IGA SERPL IA-ACNC: 18.9 SAU
B2 GLYCOPROT1 IGG SER-ACNC: <5 SGU
B2 GLYCOPROT1 IGM SER-ACNC: 8.9 SMU
BASOPHILS # BLD AUTO: 0.04 K/UL
BASOPHILS NFR BLD AUTO: 0.6 %
CARDIOLIPIN AB SER IA-ACNC: NEGATIVE
EOSINOPHIL # BLD AUTO: 0.23 K/UL
EOSINOPHIL NFR BLD AUTO: 3.6 %
HCT VFR BLD CALC: 44.8 %
HGB BLD-MCNC: 14.3 G/DL
IMM GRANULOCYTES NFR BLD AUTO: 0.2 %
LYMPHOCYTES # BLD AUTO: 2.72 K/UL
LYMPHOCYTES NFR BLD AUTO: 42.9 %
MAN DIFF?: NORMAL
MCHC RBC-ENTMCNC: 31.2 PG
MCHC RBC-ENTMCNC: 31.9 GM/DL
MCV RBC AUTO: 97.8 FL
MONOCYTES # BLD AUTO: 0.42 K/UL
MONOCYTES NFR BLD AUTO: 6.6 %
NEUTROPHILS # BLD AUTO: 2.92 K/UL
NEUTROPHILS NFR BLD AUTO: 46.1 %
PLATELET # BLD AUTO: 243 K/UL
RBC # BLD: 4.58 M/UL
RBC # FLD: 14.6 %
WBC # FLD AUTO: 6.34 K/UL

## 2018-07-22 PROBLEM — Z80.8 FAMILY HISTORY OF SKIN CANCER: Status: ACTIVE | Noted: 2017-12-26

## 2018-07-22 PROBLEM — Z86.73 HISTORY OF STROKE: Status: RESOLVED | Noted: 2017-12-05 | Resolved: 2018-07-22

## 2018-07-24 ENCOUNTER — APPOINTMENT (OUTPATIENT)
Dept: NEUROLOGY | Facility: CLINIC | Age: 48
End: 2018-07-24

## 2018-10-11 ENCOUNTER — APPOINTMENT (OUTPATIENT)
Dept: NEUROLOGY | Facility: CLINIC | Age: 48
End: 2018-10-11
Payer: COMMERCIAL

## 2018-10-11 VITALS
TEMPERATURE: 98.6 F | HEIGHT: 72 IN | HEART RATE: 80 BPM | OXYGEN SATURATION: 97 % | DIASTOLIC BLOOD PRESSURE: 71 MMHG | WEIGHT: 198 LBS | BODY MASS INDEX: 26.82 KG/M2 | SYSTOLIC BLOOD PRESSURE: 113 MMHG

## 2018-10-11 DIAGNOSIS — D68.59 OTHER PRIMARY THROMBOPHILIA: ICD-10-CM

## 2018-10-11 PROCEDURE — 99214 OFFICE O/P EST MOD 30 MIN: CPT

## 2018-10-11 RX ORDER — OSELTAMIVIR PHOSPHATE 75 MG/1
75 CAPSULE ORAL
Qty: 10 | Refills: 0 | Status: DISCONTINUED | COMMUNITY
Start: 2018-02-09 | End: 2018-10-11

## 2018-10-11 RX ORDER — LISINOPRIL 10 MG/1
10 TABLET ORAL DAILY
Refills: 0 | Status: ACTIVE | COMMUNITY

## 2018-10-11 RX ORDER — ASPIRIN 81 MG/1
81 TABLET, COATED ORAL
Qty: 30 | Refills: 5 | Status: ACTIVE | COMMUNITY
Start: 2018-10-11 | End: 1900-01-01

## 2018-10-11 RX ORDER — BUPROPION HYDROCHLORIDE 100 MG/1
TABLET, FILM COATED ORAL DAILY
Refills: 0 | Status: ACTIVE | COMMUNITY

## 2018-10-12 ENCOUNTER — APPOINTMENT (OUTPATIENT)
Dept: OTOLARYNGOLOGY | Facility: CLINIC | Age: 48
End: 2018-10-12

## 2018-10-15 LAB
AT III PPP CHRO-ACNC: 125 %
HCYS SERPL-MCNC: 7.3 UMOL/L

## 2018-10-16 DIAGNOSIS — E72.12 METHYLENETETRAHYDROFOLATE REDUCTASE DEFICIENCY: ICD-10-CM

## 2018-10-16 LAB
DNA PLOIDY SPEC FC-IMP: NORMAL
PTR INTERP: NORMAL

## 2018-11-13 ENCOUNTER — APPOINTMENT (OUTPATIENT)
Dept: OTOLARYNGOLOGY | Facility: CLINIC | Age: 48
End: 2018-11-13
Payer: COMMERCIAL

## 2018-11-13 PROCEDURE — G9163: CPT | Mod: NC,CI,GN

## 2018-11-13 PROCEDURE — 92523 SPEECH SOUND LANG COMPREHEN: CPT | Mod: GN

## 2018-11-13 PROCEDURE — G9164: CPT | Mod: NC,GN,CI

## 2018-11-13 PROCEDURE — G9162: CPT | Mod: NC,GN,CI

## 2019-04-12 ENCOUNTER — LABORATORY RESULT (OUTPATIENT)
Age: 49
End: 2019-04-12

## 2019-04-12 ENCOUNTER — APPOINTMENT (OUTPATIENT)
Dept: NEUROLOGY | Facility: CLINIC | Age: 49
End: 2019-04-12
Payer: COMMERCIAL

## 2019-04-12 VITALS
DIASTOLIC BLOOD PRESSURE: 71 MMHG | SYSTOLIC BLOOD PRESSURE: 112 MMHG | OXYGEN SATURATION: 99 % | HEART RATE: 65 BPM | TEMPERATURE: 98.2 F | BODY MASS INDEX: 26.68 KG/M2 | HEIGHT: 72 IN | WEIGHT: 197 LBS

## 2019-04-12 LAB
ALBUMIN SERPL ELPH-MCNC: 4.9 G/DL
ALP BLD-CCNC: 52 U/L
ALT SERPL-CCNC: 23 U/L
AST SERPL-CCNC: 22 U/L
BASOPHILS # BLD AUTO: 0.08 K/UL
BASOPHILS NFR BLD AUTO: 1.1 %
BILIRUB DIRECT SERPL-MCNC: 0.1 MG/DL
BILIRUB INDIRECT SERPL-MCNC: 0.1 MG/DL
BILIRUB SERPL-MCNC: 0.2 MG/DL
CHOLEST SERPL-MCNC: 260 MG/DL
CHOLEST/HDLC SERPL: 5.5 RATIO
EOSINOPHIL # BLD AUTO: 0.31 K/UL
EOSINOPHIL NFR BLD AUTO: 4.4 %
HCT VFR BLD CALC: 44 %
HDLC SERPL-MCNC: 47 MG/DL
HGB BLD-MCNC: 13.8 G/DL
IMM GRANULOCYTES NFR BLD AUTO: 0.3 %
LDLC SERPL CALC-MCNC: 150 MG/DL
LYMPHOCYTES # BLD AUTO: 2.72 K/UL
LYMPHOCYTES NFR BLD AUTO: 39 %
MAN DIFF?: NORMAL
MCHC RBC-ENTMCNC: 31.4 GM/DL
MCHC RBC-ENTMCNC: 32.2 PG
MCV RBC AUTO: 102.6 FL
MONOCYTES # BLD AUTO: 0.67 K/UL
MONOCYTES NFR BLD AUTO: 9.6 %
NEUTROPHILS # BLD AUTO: 3.18 K/UL
NEUTROPHILS NFR BLD AUTO: 45.6 %
PLATELET # BLD AUTO: 243 K/UL
PLATELET RESPONSE ASPIRIN: 383 ARU
PROT SERPL-MCNC: 7.9 G/DL
RBC # BLD: 4.29 M/UL
RBC # FLD: 13.2 %
TRIGL SERPL-MCNC: 313 MG/DL
WBC # FLD AUTO: 6.98 K/UL

## 2019-04-12 PROCEDURE — 99214 OFFICE O/P EST MOD 30 MIN: CPT

## 2019-04-12 NOTE — DISCUSSION/SUMMARY
[FreeTextEntry1] : Patient doing well- he has residual expressive aphasia\par \par C/W ASA / Plavix and lipitor\par \par Blood for P2Y12 and PRA as well as cbc, hepatic panel\par \par RTC 4 months - will discuss the need for continued duel antiplatelet \par

## 2019-04-12 NOTE — HISTORY OF PRESENT ILLNESS
[FreeTextEntry1] : Patient presents for follow up of CVA 11/2017 with positive Factor V Leiden and lupus anticoagulant. Since last visit coumadin was discontinued and he started ASA / Plavix due to negative hypercoaguable work up is negative with the exception of MTHFR gene mutation.\par \par His PMH is otherwise significant for HIV on HAART(reports negative viral load), depression and anxiety.\par \par He continues to have difficult word finding in stressful situations - has started putting himself in "stressful situations" such as cooking classes to help with this symptom and he feels it is working,.\par \par Denies new or worsening neurological deficits.

## 2019-04-12 NOTE — PHYSICAL EXAM
[FreeTextEntry1] : Constitutional: \par -Alert, in no acute distress and well nourished\par Psychiatric: \par -Oriented to person, place, and time\par -Insight and judgment intact\par -Normal affect \par Neurologic: \par -Memory: short term, remote and recent memory intact \par -Language: Mild expressive aphasia \par Cranial Nerves: \par -Visual acuity intact bilaterally\par -Visual fields full to confrontation\par -Pupils equal round and reactive to light\par -Extraocular motion intact\par -Facial sensation intact symmetrically\par -Face symmetrical\par -Hearing grossly intact bilaterally\par -Tongue and palate midline\par -Had turning and shoulder shrug symmetric \par Motor: \par -Muscle tone normal in all four extremities\par -Muscle strength normal in all four extremities\par -No pronator drift on the right. no pronator drift on the left\par Sensory exam\par -Light touch intact\par -Pain and temperature intact \par -Vibration intact \par Coordination:. \par -Normal gait\par -Balance intact. \par -Romberg's sign negative\par -No past-pointing\par -No tremor present\par - No dysmetria on finger to nose or heel to shin.\par Deep tendon reflexes: \par -1-2 throughout\par -Plantar responses normal on the right, normal on the left. \par -Ankle Clonus absent on the right, absent on the left.  \par Eyes: \par -Sclera and conjunctiva normal\par -Pupils equal in size, round, reactive to light, with normal accommodation\par -Extraocular movements intact \par -Full visual field \par Musculoskeletal: \par -Normal gait \par -Muscle strength and tone normal. \par Skin:\par -Normal skin color and pigmentation\par -Normal skin turgor \par -No skin lesions\par Respiratory:\par -No respiratory distress

## 2019-06-03 PROBLEM — B20 HIV DISEASE: Status: RESOLVED | Noted: 2017-12-05 | Resolved: 2019-06-03

## 2019-08-09 ENCOUNTER — APPOINTMENT (OUTPATIENT)
Dept: NEUROLOGY | Facility: CLINIC | Age: 49
End: 2019-08-09

## 2019-09-19 RX ORDER — CLOPIDOGREL BISULFATE 75 MG/1
75 TABLET, FILM COATED ORAL DAILY
Qty: 90 | Refills: 1 | Status: ACTIVE | COMMUNITY
Start: 2018-10-11 | End: 1900-01-01

## 2019-11-18 ENCOUNTER — APPOINTMENT (OUTPATIENT)
Dept: NEUROLOGY | Facility: CLINIC | Age: 49
End: 2019-11-18
Payer: COMMERCIAL

## 2019-11-18 VITALS
DIASTOLIC BLOOD PRESSURE: 82 MMHG | HEART RATE: 67 BPM | BODY MASS INDEX: 26.95 KG/M2 | WEIGHT: 199 LBS | SYSTOLIC BLOOD PRESSURE: 133 MMHG | HEIGHT: 72 IN | OXYGEN SATURATION: 98 % | TEMPERATURE: 98.4 F

## 2019-11-18 DIAGNOSIS — I63.232 CEREBRAL INFARCTION DUE TO UNSPECIFIED OCCLUSION OR STENOSIS OF LEFT CAROTID ARTERIES: ICD-10-CM

## 2019-11-18 PROCEDURE — 99214 OFFICE O/P EST MOD 30 MIN: CPT

## 2019-11-18 PROCEDURE — 93880 EXTRACRANIAL BILAT STUDY: CPT

## 2019-11-18 RX ORDER — ROSUVASTATIN CALCIUM 10 MG/1
10 TABLET, FILM COATED ORAL DAILY
Refills: 0 | Status: ACTIVE | COMMUNITY

## 2019-11-18 NOTE — ASSESSMENT
[FreeTextEntry1] : will keep just on plavix and stop asa. \par Recheck beta2 glycoprotein and follow up in a year \par Carotid duplex today

## 2019-11-18 NOTE — REVIEW OF SYSTEMS
[Difficulty with Language] : ~M difficulty with language [Difficulties in Speech] : speech difficulties [As Noted in HPI] : as noted in HPI [Negative] : Heme/Lymph

## 2019-11-18 NOTE — HISTORY OF PRESENT ILLNESS
[FreeTextEntry1] : Taking a class in cake decorating that forced him to speak and socialize. Went to [place] and [place]. Did very well. Mild non fluent aphasia. \par \par \par HPI:Patient presents for follow up of CVA 11/2017 with positive Factor V Leiden and lupus anticoagulant. Since last visit coumadin was discontinued and he started ASA / Plavix due to negative hypercoaguable work up is negative with the exception of MTHFR gene mutation.\par \par His PMH is otherwise significant for HIV on HAART(reports negative viral load), depression and anxiety.\par \par He continues to have difficult word finding in stressful situations - has started putting himself in "stressful situations" such as cooking classes to help with this symptom and he feels it is working,.\par \par Denies new or worsening neurological deficits.

## 2019-11-19 LAB
B2 GLYCOPROT1 IGA SERPL IA-ACNC: 24.7 SAU
B2 GLYCOPROT1 IGG SER-ACNC: <5 SGU
B2 GLYCOPROT1 IGM SER-ACNC: 8.8 SMU

## 2021-03-26 NOTE — ED ADULT NURSE REASSESSMENT NOTE - NIH STROKE SCALE: 9. BEST LANGUAGE, QM
Health Maintenance Due   Topic Date Due   • DTaP/Tdap/Td Vaccine (1 - Tdap) 12/19/1953   • Pneumococcal Vaccine 65+ (1 of 1 - PPSV23) 12/19/1997   • Medicare Wellness Visit  05/31/2020   • COVID-19 Vaccine (2 - Moderna 2-dose series) 03/04/2021       Patient is due for topics as listed above but is not proceeding with Immunization(s) Dtap/Tdap/Td and Pneumococcal at this time.          (1) Mild-to-moderate aphasia; some obvious loss of fluency or facility of comprehension, w/o significant limitation on ideas expressed or form of expression. Reduction of speech and/or comprehension, however, makes conversation about provided material difficult or impossible.  For example, in conversation about provided materials, examiner can identify picture or naming card content from patient's response.

## 2021-06-08 ENCOUNTER — TRANSCRIPTION ENCOUNTER (OUTPATIENT)
Age: 51
End: 2021-06-08

## 2021-06-10 ENCOUNTER — TRANSCRIPTION ENCOUNTER (OUTPATIENT)
Age: 51
End: 2021-06-10

## 2021-10-13 ENCOUNTER — EMERGENCY (EMERGENCY)
Facility: HOSPITAL | Age: 51
LOS: 1 days | Discharge: HOME | End: 2021-10-13
Admitting: EMERGENCY MEDICINE
Payer: COMMERCIAL

## 2021-10-13 VITALS
DIASTOLIC BLOOD PRESSURE: 81 MMHG | HEART RATE: 65 BPM | TEMPERATURE: 98 F | HEIGHT: 72 IN | RESPIRATION RATE: 16 BRPM | OXYGEN SATURATION: 99 % | SYSTOLIC BLOOD PRESSURE: 130 MMHG | WEIGHT: 199.96 LBS

## 2021-10-13 DIAGNOSIS — F41.9 ANXIETY DISORDER, UNSPECIFIED: ICD-10-CM

## 2021-10-13 DIAGNOSIS — Z21 ASYMPTOMATIC HUMAN IMMUNODEFICIENCY VIRUS [HIV] INFECTION STATUS: ICD-10-CM

## 2021-10-13 DIAGNOSIS — M79.672 PAIN IN LEFT FOOT: ICD-10-CM

## 2021-10-13 DIAGNOSIS — Z79.82 LONG TERM (CURRENT) USE OF ASPIRIN: ICD-10-CM

## 2021-10-13 DIAGNOSIS — F32.9 MAJOR DEPRESSIVE DISORDER, SINGLE EPISODE, UNSPECIFIED: ICD-10-CM

## 2021-10-13 DIAGNOSIS — I10 ESSENTIAL (PRIMARY) HYPERTENSION: ICD-10-CM

## 2021-10-13 PROCEDURE — 73630 X-RAY EXAM OF FOOT: CPT | Mod: 26,LT

## 2021-10-13 PROCEDURE — 99283 EMERGENCY DEPT VISIT LOW MDM: CPT

## 2021-10-13 RX ORDER — IBUPROFEN 200 MG
600 TABLET ORAL ONCE
Refills: 0 | Status: COMPLETED | OUTPATIENT
Start: 2021-10-13 | End: 2021-10-13

## 2021-10-13 RX ADMIN — Medication 600 MILLIGRAM(S): at 11:55

## 2021-10-13 NOTE — ED PROVIDER NOTE - NS ED MD EM SELECTION
respiration is 18 and oxygen saturation is 97%. Abdomen is distended hypoactive bowel sounds with occasional high-pitched tinkling. Tenderness throughout. Impression: Abdominal pain nausea vomiting, rule out bowel obstruction    Plan: CT scan, CBC, CMP, lactic acid, lipase, EKG, troponin, analgesia      EKG Interpretation    Interpreted by me  Sinus tachycardia with a first-degree AV block and occasional premature ventricular complex and a ventricular rate of 101, left axis deviation, right bundle branch block, inferior infarct, age undetermined  Compared EKG of October 23, 2019, ventricular rate is increased by 28    (Please note that portions of this note were completed with a voice recognition program.  Efforts were made to edit the dictations but occasionally words are mis-transcribed.)    Jordyn Rick.  Cayden Stone MD, [facility]  Attending Emergency Medicine Physician        Jackie Love MD  05/01/21 [phone] Exp Problem Focused - Mod. Complex

## 2021-10-13 NOTE — ED PROVIDER NOTE - NSFOLLOWUPINSTRUCTIONS_ED_ALL_ED_FT
RICE: RICE, ICE, COMPRESS, ELEVATE  Rest and avoid use of injured area as much as possible.  Ice for 20 minutes 4-5 times per day.  Use a cloth to contain ice or ice pack.Do not apply ice directly on the skin.    Compress area with an ace bandage or splint if possible.  Elevate area as much as possible.    -PLEASE FOLLOW-UP WITH YOUR PRIMARY CARE DOCTOR IN 1-2 DAYS.  BRING ALL PAPERWORK FROM TODAY'S VISIT TO YOUR FOLLOW-UP VISIT.  IF YOU DO NOT HAVE A PRIMARY CARE DOCTOR PLEASE REFER TO THE OFFICE/CLINIC INFORMATION GIVEN ABOVE.  YOU MAY ALSO CALL [phone] AND ASK FOR MS. SUREKHA TRUJILLO.  SHE CAN HELP YOU MAKE A FOLLOW-UP APPOINTMENT.  HER HOURS ARE 9AM-5PM MONDAY - FRIDAY.  -TAKE OVER THE COUNTER TYLENOL 650MG BY MOUTH EVERY 4-6 HOURS AS NEEDED FOR PAIN.  DO NOT MIX WITH ALCOHOL OR OTHER PRESCRIPTION MEDICATIONS THAT ALREADY CONTAIN TYLENOL OR ACETAMINOPHEN.   -TAKE OVER THE COUNTER IBUPROFEN 400-600MG BY MOUTH EVERY 8 HOURS AS NEEDED FOR PAIN.  BE SURE TO TAKE WITH FOOD OR MILK AS THIS MEDICATION CAN CAUSE STOMACH IRRITATION.  -PLEASE RETURN TO THE EMERGENCY DEPARTMENT IMMEDIATELY OR CALL 911 FOR ANY HIGH FEVER, TROUBLE BREATHING, VOMITING, SEVERE PAIN, OR ANY OTHER CONCERNS.

## 2021-10-13 NOTE — ED PROVIDER NOTE - CLINICAL SUMMARY MEDICAL DECISION MAKING FREE TEXT BOX
51 y/o M presents to ED c/o atraumatic L foot pain.  Pt well appearing, VSS, NAD.  xray wet read negative for fracture.  Given mechanism, suspect tendonitis vs other inflammatory process.    Results and diagnosis discussed with patient.  Treatment plan discussed: Supportive foot wear, RICE, NSAIDS.  Return precautions discussed.  Pt verbalized understanding and given the opportunity to ask questions.  Patient advised to follow up with podiatry.

## 2021-10-13 NOTE — ED PROVIDER NOTE - TEMPLATE, MLM
Pt dropped disc off in [facility] for Dr. Chaves File review. Uploaded disc and returned to pt. Pt said that Dr. Bruce Cassidy wants it to compare it.
Orthopedic

## 2021-10-13 NOTE — ED PROVIDER NOTE - NSICDXPASTMEDICALHX_GEN_ALL_CORE_FT
PAST MEDICAL HISTORY:  Anxiety     Depression     HIV (human immunodeficiency virus infection)     Hypertension

## 2021-10-13 NOTE — ED ADULT TRIAGE NOTE - CHIEF COMPLAINT QUOTE
Walk in with increasing left foot pain since last night, worse with weight bearing activity, no known associated injury.

## 2021-10-13 NOTE — ED PROVIDER NOTE - NSFOLLOWUPCLINICS_GEN_ALL_ED_FT
[facility] - Podiatry Clinic  Podiatry  [address] [place] [address]  Phone: [phone]  Fax:

## 2021-10-13 NOTE — ED ADULT NURSE NOTE - NSIMPLEMENTINTERV_GEN_ALL_ED
Implemented All Universal Safety Interventions:  [place] to call system. Call bell, personal items and telephone within reach. Instruct patient to call for assistance. Room bathroom lighting operational. Non-slip footwear when patient is off stretcher. Physically safe environment: no spills, clutter or unnecessary equipment. Stretcher in lowest position, wheels locked, appropriate side rails in place. Simple: Patient demonstrates quick and easy understanding/Moderate: Comprehensive teaching

## 2021-10-13 NOTE — ED PROVIDER NOTE - PATIENT PORTAL LINK FT
You can access the FollowMyHealth Patient Portal offered by [facility] by registering at the following website: [email or web address]. By joining [employer]’s FollowMyHealth portal, you will also be able to view your health information using other applications (apps) compatible with our system.

## 2021-10-13 NOTE — ED PROVIDER NOTE - WR INTERPRETATION 1
Preventive:   - none at this time  - consider adding a muscle relaxant  Abortive:   - will try Depakote 500 mg at bedtime for 5 days  - did not want to try decadron  - if Depakote fails consider adding cyproheptadine 4 mg 1/4 of a pill at bedtime daily to see if that helps   Also consider Lamictal      Follow up in 2 months [facility] XR negative - No fracture, No dislocation, No foreign body

## 2021-10-13 NOTE — ED PROVIDER NOTE - MUSCULOSKELETAL MINIMAL EXAM
L foot:  minimal ttp to lateral foot with subtle edema, no deformity, no ecchymosis, no bony crepitus, no mid foot ttp, no malleolar ttp, FROM, distal sensation, brisk cap refill + 2 pedal pulse

## 2021-10-13 NOTE — ED PROVIDER NOTE - OBJECTIVE STATEMENT
51 y/o with PMH of HTN, HIV On CARLOS, CVA, depression presents to ED c/o atraumatic L lateral foot pain x 1 day.  His pain is illicit and worsened with bearing weight.  He describes feeling cramp like sensation in the area and moving his foot to "crack it" with immediate relief of the cramp.  His pain started shortly thereafter.  He denies numbness/ tingling, discoloration, trauma/falls, swelling, redness.

## 2021-11-24 ENCOUNTER — TRANSCRIPTION ENCOUNTER (OUTPATIENT)
Age: 51
End: 2021-11-24

## 2022-03-19 ENCOUNTER — TRANSCRIPTION ENCOUNTER (OUTPATIENT)
Age: 52
End: 2022-03-19

## 2022-06-26 ENCOUNTER — EMERGENCY (EMERGENCY)
Facility: HOSPITAL | Age: 52
LOS: 1 days | Discharge: HOME | End: 2022-06-26
Admitting: EMERGENCY MEDICINE
Payer: COMMERCIAL

## 2022-06-26 VITALS
DIASTOLIC BLOOD PRESSURE: 82 MMHG | TEMPERATURE: 99 F | RESPIRATION RATE: 16 BRPM | OXYGEN SATURATION: 98 % | HEART RATE: 78 BPM | HEIGHT: 72 IN | SYSTOLIC BLOOD PRESSURE: 127 MMHG | WEIGHT: 199.96 LBS

## 2022-06-26 VITALS
DIASTOLIC BLOOD PRESSURE: 80 MMHG | OXYGEN SATURATION: 97 % | HEART RATE: 62 BPM | RESPIRATION RATE: 17 BRPM | SYSTOLIC BLOOD PRESSURE: 116 MMHG

## 2022-06-26 PROBLEM — I10 ESSENTIAL (PRIMARY) HYPERTENSION: Chronic | Status: ACTIVE | Noted: 2021-10-13

## 2022-06-26 LAB
ALBUMIN SERPL ELPH-MCNC: 4.4 G/DL — SIGNIFICANT CHANGE UP (ref 3.4–5)
ALP SERPL-CCNC: 56 U/L — SIGNIFICANT CHANGE UP (ref 40–120)
ALT FLD-CCNC: 54 U/L — HIGH (ref 12–42)
ANION GAP SERPL CALC-SCNC: 10 MMOL/L — SIGNIFICANT CHANGE UP (ref 9–16)
AST SERPL-CCNC: 41 U/L — HIGH (ref 15–37)
BASOPHILS # BLD AUTO: 0.03 K/UL — SIGNIFICANT CHANGE UP (ref 0–0.2)
BASOPHILS NFR BLD AUTO: 0.6 % — SIGNIFICANT CHANGE UP (ref 0–2)
BILIRUB SERPL-MCNC: 0.3 MG/DL — SIGNIFICANT CHANGE UP (ref 0.2–1.2)
BUN SERPL-MCNC: 16 MG/DL — SIGNIFICANT CHANGE UP (ref 7–23)
CALCIUM SERPL-MCNC: 8.3 MG/DL — LOW (ref 8.5–10.5)
CHLORIDE SERPL-SCNC: 101 MMOL/L — SIGNIFICANT CHANGE UP (ref 96–108)
CO2 SERPL-SCNC: 26 MMOL/L — SIGNIFICANT CHANGE UP (ref 22–31)
CREAT SERPL-MCNC: 1.06 MG/DL — SIGNIFICANT CHANGE UP (ref 0.5–1.3)
EGFR: 84 ML/MIN/1.73M2 — SIGNIFICANT CHANGE UP
EOSINOPHIL # BLD AUTO: 0.07 K/UL — SIGNIFICANT CHANGE UP (ref 0–0.5)
EOSINOPHIL NFR BLD AUTO: 1.4 % — SIGNIFICANT CHANGE UP (ref 0–6)
GLUCOSE SERPL-MCNC: 128 MG/DL — HIGH (ref 70–99)
HCT VFR BLD CALC: 40.3 % — SIGNIFICANT CHANGE UP (ref 39–50)
HGB BLD-MCNC: 13.4 G/DL — SIGNIFICANT CHANGE UP (ref 13–17)
IMM GRANULOCYTES NFR BLD AUTO: 0.4 % — SIGNIFICANT CHANGE UP (ref 0–1.5)
LIDOCAIN IGE QN: 105 U/L — SIGNIFICANT CHANGE UP (ref 73–393)
LYMPHOCYTES # BLD AUTO: 1.27 K/UL — SIGNIFICANT CHANGE UP (ref 1–3.3)
LYMPHOCYTES # BLD AUTO: 26.2 % — SIGNIFICANT CHANGE UP (ref 13–44)
MAGNESIUM SERPL-MCNC: 2.1 MG/DL — SIGNIFICANT CHANGE UP (ref 1.6–2.6)
MCHC RBC-ENTMCNC: 32.6 PG — SIGNIFICANT CHANGE UP (ref 27–34)
MCHC RBC-ENTMCNC: 33.3 GM/DL — SIGNIFICANT CHANGE UP (ref 32–36)
MCV RBC AUTO: 98.1 FL — SIGNIFICANT CHANGE UP (ref 80–100)
MONOCYTES # BLD AUTO: 0.59 K/UL — SIGNIFICANT CHANGE UP (ref 0–0.9)
MONOCYTES NFR BLD AUTO: 12.2 % — SIGNIFICANT CHANGE UP (ref 2–14)
NEUTROPHILS # BLD AUTO: 2.86 K/UL — SIGNIFICANT CHANGE UP (ref 1.8–7.4)
NEUTROPHILS NFR BLD AUTO: 59.2 % — SIGNIFICANT CHANGE UP (ref 43–77)
NRBC # BLD: 0 /100 WBCS — SIGNIFICANT CHANGE UP (ref 0–0)
PLATELET # BLD AUTO: 225 K/UL — SIGNIFICANT CHANGE UP (ref 150–400)
POTASSIUM SERPL-MCNC: 3.9 MMOL/L — SIGNIFICANT CHANGE UP (ref 3.5–5.3)
POTASSIUM SERPL-SCNC: 3.9 MMOL/L — SIGNIFICANT CHANGE UP (ref 3.5–5.3)
PROT SERPL-MCNC: 8.6 G/DL — HIGH (ref 6.4–8.2)
RBC # BLD: 4.11 M/UL — LOW (ref 4.2–5.8)
RBC # FLD: 13.2 % — SIGNIFICANT CHANGE UP (ref 10.3–14.5)
SODIUM SERPL-SCNC: 137 MMOL/L — SIGNIFICANT CHANGE UP (ref 132–145)
TROPONIN I, HIGH SENSITIVITY RESULT: 4.3 NG/L — SIGNIFICANT CHANGE UP
TROPONIN I, HIGH SENSITIVITY RESULT: 5.5 NG/L — SIGNIFICANT CHANGE UP
WBC # BLD: 4.84 K/UL — SIGNIFICANT CHANGE UP (ref 3.8–10.5)
WBC # FLD AUTO: 4.84 K/UL — SIGNIFICANT CHANGE UP (ref 3.8–10.5)

## 2022-06-26 PROCEDURE — 99284 EMERGENCY DEPT VISIT MOD MDM: CPT

## 2022-06-26 PROCEDURE — 93010 ELECTROCARDIOGRAM REPORT: CPT

## 2022-06-26 PROCEDURE — 71046 X-RAY EXAM CHEST 2 VIEWS: CPT | Mod: 26

## 2022-06-26 RX ORDER — TRAMADOL HYDROCHLORIDE 50 MG/1
50 TABLET ORAL ONCE
Refills: 0 | Status: DISCONTINUED | OUTPATIENT
Start: 2022-06-26 | End: 2022-06-26

## 2022-06-26 RX ADMIN — TRAMADOL HYDROCHLORIDE 50 MILLIGRAM(S): 50 TABLET ORAL at 12:18

## 2022-06-26 NOTE — ED ADULT NURSE NOTE - OBJECTIVE STATEMENT
covid + via home test on thursday. C/o waking this am with chest pain and nausea, called PCP and told to come to ER for eval.

## 2022-06-26 NOTE — ED PROVIDER NOTE - PATIENT PORTAL LINK FT
You can access the FollowMyHealth Patient Portal offered by [facility] by registering at the following website: [email or web address]. By joining [facility]’s FollowMyHealth portal, you will also be able to view your health information using other applications (apps) compatible with our system.

## 2022-06-26 NOTE — ED PROVIDER NOTE - NSICDXPASTMEDICALHX_GEN_ALL_CORE_FT
PAST MEDICAL HISTORY:  Anxiety     Depression     HIV (human immunodeficiency virus infection)     Hypertension       Cerebrovascular accident (CVA)     High cholesterol     Hypothyroidism

## 2022-06-26 NOTE — ED PROVIDER NOTE - OBJECTIVE STATEMENT
52 y/o M with a Hx of CVA, HIV, HTN, anxiety/depression, high cholesterol, and hypothyroidism, presents with bilateral chest pain for the past 2 days. Pt tested positive for COVID 3 days ago, and reports he has been coughing pretty heavily, and his cough is dry in nature. Pt also reports some nasal congestion. He denies fevers, chills, shortness of breath, nausea, vomiting, abdominal pain, headache, and dizziness. Pt spoke with his PMD this morning, who recommended coming to the ED for further evaluation.

## 2022-06-26 NOTE — ED PROVIDER NOTE - CARDIAC, MLM
Normal rate, regular rhythm.  Heart sounds S1, S2.  No murmurs, rubs or gallops. Normal rate, regular rhythm.  Heart sounds S1, S2.  No murmurs, rubs or gallops. Mild reproducible chest tenderness in the upper chest wall region. No lower extremity edema, no calf tenderness.

## 2022-06-26 NOTE — ED ADULT NURSE NOTE - ISOLATION PROVIDED EDUCATION
Patient
Is This A New Presentation, Or A Follow-Up?: Skin Lesions
How Severe Is Your Skin Lesion?: mild
Have Your Skin Lesions Been Treated?: not been treated

## 2022-06-26 NOTE — ED PROVIDER NOTE - CLINICAL SUMMARY MEDICAL DECISION MAKING FREE TEXT BOX
52 y/o M with a Hx of CVA, HIV, HTN, anxiety/depression, high cholesterol, and hypothyroidism, presents with anterior chest wall pain for the past 2 days. I have suspicion for symptoms secondary to frequent and heavy dry coughing. Pt otherwise well-appearing, in no distress, with no diaphoresis. Will obtain cardiac enzymes to rule out acute MI. Pt's HR 78 in triage, low suspicion for PE. Will obtain chest X-ray, EKG, labs and give IM Toradol for pain. Dispo pending medical workup.

## 2022-06-26 NOTE — ED PROVIDER NOTE - NSFOLLOWUPINSTRUCTIONS_ED_ALL_ED_FT
Chest Wall Pain      Chest wall pain is pain in or around the bones and muscles of your chest. Sometimes, an injury causes this pain. Excessive coughing or overuse of arm and chest muscles may also cause chest wall pain. Sometimes, the cause may not be known. This pain may take several weeks or longer to get better.      Follow these instructions at home:      Managing pain, stiffness, and swelling   A bag of ice on a towel on the skin. •If directed, put ice on the painful area:  •Put ice in a plastic bag.      •Place a towel between your skin and the bag.      •Leave the ice on for 20 minutes, 2–3 times per day.        Activity     •Rest as told by your health care provider.      •Avoid activities that cause pain. These include any activities that use your chest muscles or your abdominal and side muscles to lift heavy items. Ask your health care provider what activities are safe for you.        General instructions   A do not smoke cigarettes sign.   •Take over-the-counter and prescription medicines only as told by your health care provider.      • Do not use any products that contain nicotine or tobacco, such as cigarettes, e-cigarettes, and chewing tobacco. These can delay healing after injury. If you need help quitting, ask your health care provider.      •Keep all follow-up visits as told by your health care provider. This is important.        Contact a health care provider if:    •You have a fever.      •Your chest pain becomes worse.      •You have new symptoms.        Get help right away if:    •You have nausea or vomiting.      •You feel sweaty or light-headed.      •You have a cough with mucus from your lungs (sputum) or you cough up blood.      •You develop shortness of breath.      These symptoms may represent a serious problem that is an emergency. Do not wait to see if the symptoms will go away. Get medical help right away. Call your local emergency services (911 in the [place] Do not drive yourself to the hospital.       Summary    •Chest wall pain is pain in or around the bones and muscles of your chest.      •Depending on the cause, it may be treated with ice, rest, medicines, and avoiding activities that cause pain.      •Contact a health care provider if you have a fever, worsening chest pain, or new symptoms.      •Get help right away if you feel light-headed or you develop shortness of breath. These symptoms may be an emergency.      This information is not intended to replace advice given to you by your health care provider. Make sure you discuss any questions you have with your health care provider.

## 2022-06-29 DIAGNOSIS — R07.89 OTHER CHEST PAIN: ICD-10-CM

## 2022-06-29 DIAGNOSIS — Z86.73 PERSONAL HISTORY OF TRANSIENT ISCHEMIC ATTACK (TIA), AND CEREBRAL INFARCTION WITHOUT RESIDUAL DEFICITS: ICD-10-CM

## 2022-06-29 DIAGNOSIS — I10 ESSENTIAL (PRIMARY) HYPERTENSION: ICD-10-CM

## 2022-06-29 DIAGNOSIS — Z21 ASYMPTOMATIC HUMAN IMMUNODEFICIENCY VIRUS [HIV] INFECTION STATUS: ICD-10-CM

## 2022-06-29 DIAGNOSIS — R09.81 NASAL CONGESTION: ICD-10-CM

## 2022-06-29 DIAGNOSIS — E78.00 PURE HYPERCHOLESTEROLEMIA, UNSPECIFIED: ICD-10-CM

## 2022-06-29 DIAGNOSIS — E03.9 HYPOTHYROIDISM, UNSPECIFIED: ICD-10-CM

## 2022-06-29 DIAGNOSIS — Z79.82 LONG TERM (CURRENT) USE OF ASPIRIN: ICD-10-CM

## 2022-06-29 DIAGNOSIS — F41.8 OTHER SPECIFIED ANXIETY DISORDERS: ICD-10-CM

## 2022-06-29 DIAGNOSIS — R05.9 COUGH, UNSPECIFIED: ICD-10-CM

## 2024-03-15 PROBLEM — E03.9 HYPOTHYROIDISM, UNSPECIFIED: Chronic | Status: ACTIVE | Noted: 2022-06-26

## 2024-03-15 PROBLEM — E78.00 PURE HYPERCHOLESTEROLEMIA, UNSPECIFIED: Chronic | Status: ACTIVE | Noted: 2022-06-26

## 2024-03-15 PROBLEM — I63.9 CEREBRAL INFARCTION, UNSPECIFIED: Chronic | Status: ACTIVE | Noted: 2022-06-26

## 2024-07-11 ENCOUNTER — NON-APPOINTMENT (OUTPATIENT)
Age: 54
End: 2024-07-11

## 2024-07-11 ENCOUNTER — APPOINTMENT (OUTPATIENT)
Dept: NEUROLOGY | Facility: CLINIC | Age: 54
End: 2024-07-11
Payer: COMMERCIAL

## 2024-07-11 ENCOUNTER — LABORATORY RESULT (OUTPATIENT)
Age: 54
End: 2024-07-11

## 2024-07-11 VITALS
WEIGHT: 203 LBS | DIASTOLIC BLOOD PRESSURE: 78 MMHG | SYSTOLIC BLOOD PRESSURE: 122 MMHG | OXYGEN SATURATION: 98 % | BODY MASS INDEX: 27.5 KG/M2 | HEART RATE: 66 BPM | HEIGHT: 72 IN | TEMPERATURE: 98.2 F

## 2024-07-11 DIAGNOSIS — Z15.89 GENETIC SUSCEPTIBILITY TO OTHER DISEASE: ICD-10-CM

## 2024-07-11 DIAGNOSIS — I63.232 CEREBRAL INFARCTION DUE TO UNSPECIFIED OCCLUSION OR STENOSIS OF LEFT CAROTID ARTERIES: ICD-10-CM

## 2024-07-11 LAB
ALBUMIN SERPL ELPH-MCNC: 5.3 G/DL
ALP BLD-CCNC: 52 U/L
ALT SERPL-CCNC: 32 U/L
ANION GAP SERPL CALC-SCNC: 13 MMOL/L
BUN SERPL-MCNC: 13 MG/DL
CALCIUM SERPL-MCNC: 9.9 MG/DL
CHLORIDE SERPL-SCNC: 101 MMOL/L
CHOLEST SERPL-MCNC: 175 MG/DL
CREAT SERPL-MCNC: 1.08 MG/DL
CRP SERPL HS-MCNC: 0.77 MG/L
DEPRECATED D DIMER PPP IA-ACNC: <150 NG/ML DDU
ERYTHROCYTE [SEDIMENTATION RATE] IN BLOOD BY WESTERGREN METHOD: 3 MM/HR
ESTIMATED AVERAGE GLUCOSE: 108 MG/DL
GLUCOSE SERPL-MCNC: 92 MG/DL
HBA1C MFR BLD HPLC: 5.4 %
HDLC SERPL-MCNC: 45 MG/DL
INR PPP: 0.91 RATIO
LDLC SERPL CALC-MCNC: 97 MG/DL
NONHDLC SERPL-MCNC: 131 MG/DL
PA ADP PRP-ACNC: 58 PRU
POTASSIUM SERPL-SCNC: 4.7 MMOL/L
PROT SERPL-MCNC: 8.2 G/DL
PT BLD: 10.3 SEC
SODIUM SERPL-SCNC: 138 MMOL/L
TRIGL SERPL-MCNC: 197 MG/DL

## 2024-07-11 PROCEDURE — 99204 OFFICE O/P NEW MOD 45 MIN: CPT

## 2024-07-11 RX ORDER — SERTRALINE HYDROCHLORIDE 50 MG/1
50 TABLET, FILM COATED ORAL DAILY
Refills: 0 | Status: ACTIVE | COMMUNITY

## 2024-07-11 RX ORDER — TESTOSTERONE CYPIONATE 200 MG/ML
200 VIAL (ML) INTRAMUSCULAR
Refills: 0 | Status: ACTIVE | COMMUNITY

## 2024-07-11 RX ORDER — ROSUVASTATIN CALCIUM 40 MG/1
40 TABLET, FILM COATED ORAL
Refills: 0 | Status: ACTIVE | COMMUNITY

## 2024-07-11 RX ORDER — BICTEGRAVIR SODIUM, EMTRICITABINE, AND TENOFOVIR ALAFENAMIDE FUMARATE 50; 200; 25 MG/1; MG/1; MG/1
50-200-25 TABLET ORAL
Refills: 0 | Status: ACTIVE | COMMUNITY

## 2024-07-11 RX ORDER — LEVOTHYROXINE SODIUM 0.11 MG/1
112 TABLET ORAL DAILY
Refills: 0 | Status: ACTIVE | COMMUNITY

## 2024-07-11 RX ORDER — BUPROPION HYDROCHLORIDE 150 MG/1
150 TABLET, EXTENDED RELEASE ORAL
Refills: 0 | Status: ACTIVE | COMMUNITY

## 2024-07-12 LAB
APO LP(A) SERPL-MCNC: 146.7 NMOL/L
B2 GLYCOPROT1 IGA SERPL IA-ACNC: 19 SAU
B2 GLYCOPROT1 IGG SER-ACNC: <5 SGU
B2 GLYCOPROT1 IGM SER-ACNC: 6.4 SMU
CARDIOLIPIN AB SER IA-ACNC: NEGATIVE
CONFIRM: 29.4 SEC
DRVVT IMM 1:2 NP PPP: NORMAL
DRVVT SCREEN TO CONFIRM RATIO: 1.09 RATIO
SCREEN DRVVT: 37.7 SEC

## 2024-07-14 LAB — PROT S FREE PPP-ACNC: 78 %

## 2024-08-01 ENCOUNTER — APPOINTMENT (OUTPATIENT)
Dept: NEUROLOGY | Facility: CLINIC | Age: 54
End: 2024-08-01

## 2024-08-01 ENCOUNTER — NON-APPOINTMENT (OUTPATIENT)
Age: 54
End: 2024-08-01

## 2024-08-01 PROCEDURE — 93886 INTRACRANIAL COMPLETE STUDY: CPT

## 2024-08-01 PROCEDURE — 93880 EXTRACRANIAL BILAT STUDY: CPT

## 2024-08-06 ENCOUNTER — NON-APPOINTMENT (OUTPATIENT)
Age: 54
End: 2024-08-06

## 2025-02-24 NOTE — PATIENT PROFILE ADULT. - NS PRO TALK SOMEONE YN
Please follow up with family medicine if symptoms continue for pain management.    Please take Robaxin as prescribed to the pharmacy. This is a muscle relaxer. Do not drive or operate heavy machinery while taking this medication.    Take tylenol and motrin for pain. Take as directed on the box.    Can use lidoderm patch or lidoderm gel over the counter for pain.    Apply heat to the area 15-20 minutes up to 4 times a day.    Please return if symptoms worsen or new symptoms develop.   no